# Patient Record
Sex: FEMALE | Race: WHITE
[De-identification: names, ages, dates, MRNs, and addresses within clinical notes are randomized per-mention and may not be internally consistent; named-entity substitution may affect disease eponyms.]

---

## 2017-03-13 PROBLEM — Z00.00 ENCOUNTER FOR PREVENTIVE HEALTH EXAMINATION: Status: ACTIVE | Noted: 2017-03-13

## 2017-04-17 ENCOUNTER — APPOINTMENT (OUTPATIENT)
Dept: UROGYNECOLOGY | Facility: CLINIC | Age: 57
End: 2017-04-17

## 2017-04-17 VITALS
SYSTOLIC BLOOD PRESSURE: 125 MMHG | DIASTOLIC BLOOD PRESSURE: 81 MMHG | HEIGHT: 64 IN | BODY MASS INDEX: 30.22 KG/M2 | WEIGHT: 177 LBS

## 2017-04-17 DIAGNOSIS — N39.41 URGE INCONTINENCE: ICD-10-CM

## 2017-04-17 LAB
BILIRUB UR QL STRIP: NEGATIVE
CLARITY UR: CLEAR
COLLECTION METHOD: NORMAL
GLUCOSE UR-MCNC: NEGATIVE
HCG UR QL: 1 EU/DL
HGB UR QL STRIP.AUTO: NORMAL
KETONES UR-MCNC: NEGATIVE
LEUKOCYTE ESTERASE UR QL STRIP: NEGATIVE
NITRITE UR QL STRIP: NEGATIVE
PH UR STRIP: 5.5
PROT UR STRIP-MCNC: NEGATIVE
SP GR UR STRIP: 1.02

## 2017-04-18 ENCOUNTER — RESULT REVIEW (OUTPATIENT)
Age: 57
End: 2017-04-18

## 2017-04-18 LAB
APPEARANCE: CLEAR
BACTERIA: NEGATIVE
BILIRUBIN URINE: NEGATIVE
BLOOD URINE: NEGATIVE
COLOR: YELLOW
CORE LAB FLUID CYTOLOGY: NORMAL
GLUCOSE QUALITATIVE U: NORMAL MG/DL
HYALINE CASTS: 0 /LPF
KETONES URINE: NEGATIVE
LEUKOCYTE ESTERASE URINE: NEGATIVE
MICROSCOPIC-UA: NORMAL
NITRITE URINE: NEGATIVE
PH URINE: 5.5
PROTEIN URINE: NEGATIVE MG/DL
RED BLOOD CELLS URINE: 3 /HPF
SPECIFIC GRAVITY URINE: 1.01
SQUAMOUS EPITHELIAL CELLS: 0 /HPF
UROBILINOGEN URINE: NORMAL MG/DL
WHITE BLOOD CELLS URINE: 0 /HPF

## 2017-04-19 ENCOUNTER — RESULT REVIEW (OUTPATIENT)
Age: 57
End: 2017-04-19

## 2017-04-19 LAB — BACTERIA UR CULT: NORMAL

## 2017-04-20 ENCOUNTER — APPOINTMENT (OUTPATIENT)
Dept: UROGYNECOLOGY | Facility: CLINIC | Age: 57
End: 2017-04-20

## 2017-04-20 VITALS
WEIGHT: 177 LBS | SYSTOLIC BLOOD PRESSURE: 123 MMHG | HEIGHT: 64 IN | BODY MASS INDEX: 30.22 KG/M2 | DIASTOLIC BLOOD PRESSURE: 77 MMHG

## 2017-06-13 ENCOUNTER — APPOINTMENT (OUTPATIENT)
Dept: UROGYNECOLOGY | Facility: CLINIC | Age: 57
End: 2017-06-13

## 2019-10-17 ENCOUNTER — APPOINTMENT (OUTPATIENT)
Dept: UROGYNECOLOGY | Facility: CLINIC | Age: 59
End: 2019-10-17
Payer: COMMERCIAL

## 2019-10-17 VITALS — DIASTOLIC BLOOD PRESSURE: 78 MMHG | SYSTOLIC BLOOD PRESSURE: 140 MMHG

## 2019-10-17 DIAGNOSIS — E03.9 HYPOTHYROIDISM, UNSPECIFIED: ICD-10-CM

## 2019-10-17 LAB
BILIRUB UR QL STRIP: NEGATIVE
CLARITY UR: CLEAR
COLLECTION METHOD: NORMAL
GLUCOSE UR-MCNC: NEGATIVE
HCG UR QL: 0.2 EU/DL
HGB UR QL STRIP.AUTO: NORMAL
KETONES UR-MCNC: NEGATIVE
LEUKOCYTE ESTERASE UR QL STRIP: NORMAL
NITRITE UR QL STRIP: POSITIVE
PH UR STRIP: 6
PROT UR STRIP-MCNC: NEGATIVE
SP GR UR STRIP: 1.01

## 2019-10-17 PROCEDURE — 99214 OFFICE O/P EST MOD 30 MIN: CPT | Mod: 25

## 2019-10-17 PROCEDURE — 81003 URINALYSIS AUTO W/O SCOPE: CPT | Mod: QW

## 2019-10-17 PROCEDURE — 51701 INSERT BLADDER CATHETER: CPT

## 2019-10-17 RX ORDER — LEVOTHYROXINE SODIUM 75 UG/1
75 TABLET ORAL
Refills: 0 | Status: DISCONTINUED | COMMUNITY
End: 2019-10-17

## 2019-10-17 NOTE — PHYSICAL EXAM
[No Acute Distress] : in no acute distress [Well Nourished] : ~L well nourished [Well developed] : well developed [No Edema] : ~T edema was not present [Oriented x3] : oriented to person, place, and time [Normal Gait] : gait was normal [Warm and Dry] : was warm and dry to touch [Normal Appearance] : general appearance was normal [2] : 2 [Aa ____] : Aa [unfilled] [Ba ____] : Ba [unfilled] [C ____] : C [unfilled] [GH ____] : GH [unfilled] [PB ____] : PB [unfilled] [TVL ____] : TVL  [unfilled] [Bp ____] : Bp [unfilled] [D ____] : D [unfilled] [Ap ____] : Ap [unfilled] [Normal] : no abnormalities [Post Void Residual ____ml] : post void residual via catheterization was [unfilled] ml [Tenderness] : ~T no ~M abdominal tenderness observed [Cough] : no cough [Hernia] : no hernia observed [Distended] : not distended

## 2019-10-17 NOTE — DISCUSSION/SUMMARY
[FreeTextEntry1] : Isela wants definitive surgery. She has been managing her prolapse with a pessary for 15 years and she is ready to have surgery. I recommended a pelvic ultrasound. Urodynamics was 2 years old so I recommend we do that again as her urinary symptoms have changed. I decided to treat her urine dip abnormality today. She recently reported a kidney infection but was asymptomatic until she ended up in the hospital. She's also has some bowel control issues recently. I gave her Macrobid twice a day for 7 days. I did send her urine to the lab. We talked about the options for surgically correcting her prolapse. We reviewed robotic surgery with or without mesh augmentation. We talked about what would be involved with the surgery the hospital stay and the convalescence. I was able to answer all of her questions.

## 2019-10-17 NOTE — HISTORY OF PRESENT ILLNESS
[FreeTextEntry1] : Has been using pessary for the last 15 years or so. She has had enough. She has been managing it herself. Takes it out daily to clean. Got kidney infection a few months back and is thinking the pessary could have something to do with it. Has some urgency and frequency. Not wearing pads. Chronic constipation. Pessary right at introitus by the end of the day. She has not had any surgery. Up to date on pap smear. No recent pelvic U/S.

## 2019-10-18 ENCOUNTER — RESULT REVIEW (OUTPATIENT)
Age: 59
End: 2019-10-18

## 2019-10-21 ENCOUNTER — APPOINTMENT (OUTPATIENT)
Dept: UROGYNECOLOGY | Facility: CLINIC | Age: 59
End: 2019-10-21

## 2019-10-21 ENCOUNTER — RESULT REVIEW (OUTPATIENT)
Age: 59
End: 2019-10-21

## 2019-10-23 ENCOUNTER — RESULT REVIEW (OUTPATIENT)
Age: 59
End: 2019-10-23

## 2019-10-24 ENCOUNTER — APPOINTMENT (OUTPATIENT)
Dept: UROGYNECOLOGY | Facility: CLINIC | Age: 59
End: 2019-10-24

## 2019-11-12 ENCOUNTER — OUTPATIENT (OUTPATIENT)
Dept: OUTPATIENT SERVICES | Facility: HOSPITAL | Age: 59
LOS: 1 days | End: 2019-11-12
Payer: COMMERCIAL

## 2019-11-12 ENCOUNTER — APPOINTMENT (OUTPATIENT)
Dept: ULTRASOUND IMAGING | Facility: CLINIC | Age: 59
End: 2019-11-12
Payer: COMMERCIAL

## 2019-11-12 DIAGNOSIS — N81.4 UTEROVAGINAL PROLAPSE, UNSPECIFIED: ICD-10-CM

## 2019-11-12 PROCEDURE — 76856 US EXAM PELVIC COMPLETE: CPT | Mod: 26,59

## 2019-11-12 PROCEDURE — 76830 TRANSVAGINAL US NON-OB: CPT | Mod: 26

## 2019-11-12 PROCEDURE — 76856 US EXAM PELVIC COMPLETE: CPT

## 2019-11-12 PROCEDURE — 76830 TRANSVAGINAL US NON-OB: CPT

## 2019-11-19 ENCOUNTER — APPOINTMENT (OUTPATIENT)
Dept: INTERNAL MEDICINE | Facility: CLINIC | Age: 59
End: 2019-11-19

## 2019-12-05 ENCOUNTER — APPOINTMENT (OUTPATIENT)
Dept: UROGYNECOLOGY | Facility: CLINIC | Age: 59
End: 2019-12-05
Payer: COMMERCIAL

## 2019-12-05 DIAGNOSIS — N32.81 OVERACTIVE BLADDER: ICD-10-CM

## 2019-12-05 DIAGNOSIS — R35.0 FREQUENCY OF MICTURITION: ICD-10-CM

## 2019-12-05 PROCEDURE — 51784 ANAL/URINARY MUSCLE STUDY: CPT

## 2019-12-05 PROCEDURE — 51797 INTRAABDOMINAL PRESSURE TEST: CPT

## 2019-12-05 PROCEDURE — 51741 ELECTRO-UROFLOWMETRY FIRST: CPT

## 2019-12-05 PROCEDURE — 51729 CYSTOMETROGRAM W/VP&UP: CPT

## 2020-01-02 ENCOUNTER — APPOINTMENT (OUTPATIENT)
Dept: UROGYNECOLOGY | Facility: CLINIC | Age: 60
End: 2020-01-02
Payer: COMMERCIAL

## 2020-01-02 DIAGNOSIS — N39.41 URGE INCONTINENCE: ICD-10-CM

## 2020-01-02 PROCEDURE — 99214 OFFICE O/P EST MOD 30 MIN: CPT

## 2020-01-02 NOTE — DISCUSSION/SUMMARY
[FreeTextEntry1] : Ms. Couch presented to the office today for counseling regarding her decision for possible pelvic reconstructive surgery. All pertinent prior studies including urodynamic were reviewed. 						\par She was counseled regarding alternative non-surgical therapies as well as the prognosis with no intervention.\par \par The patient was advised regarding various surgical options including abdominal, robotic/laparascopic and vaginal approaches. The risks and benefits of surgery using endogenous tissue only versus the use of graft insertion (mesh) were fully reviewed.  She was informed of the potential for improved durability and the inherent risk of graft use including but not limited to infection, erosion, pain, pain with intercourse, cervical elongation, disturbance in bowel or bladder function, any of which may require additional surgery for mesh revision.  She is aware that the mesh used in her surgery is a permanent mesh.  \par \par The general risks of the surgery were reviewed including, but not limited to infection, bleeding, surrounding organ or tissue injury, failure meaning recurrent prolapse, leaking, voiding dysfunction, needing to go home with a catheter and the risks of anesthesia.\par \par The approximate length of the surgery, hospital stay and postoperative recovery period were reviewed, including a general overview of convalescence and postoperative followup.\par \par The patient verbalized a desire to proceed with the surgery.  Appropriate informed consent was obtained robotic NORMA possible BSO SCP sling and cysto.  All questions were answered to the patient's satisfaction and we are looking to schedule her.\par \par

## 2020-01-02 NOTE — HISTORY OF PRESENT ILLNESS
[FreeTextEntry1] : Here to review options and testing. U/S showed 7mm lining and biopsy showed fragments of polyp. UDTs showed stress leaks and DO. We discussed risk of post op leaking and she wants a sling. We also discussed R/B/A BSO and she wants more time to think about it.

## 2020-01-28 ENCOUNTER — APPOINTMENT (OUTPATIENT)
Dept: UROGYNECOLOGY | Facility: HOSPITAL | Age: 60
End: 2020-01-28

## 2020-02-06 ENCOUNTER — APPOINTMENT (OUTPATIENT)
Dept: UROGYNECOLOGY | Facility: CLINIC | Age: 60
End: 2020-02-06

## 2020-03-05 ENCOUNTER — APPOINTMENT (OUTPATIENT)
Dept: UROGYNECOLOGY | Facility: CLINIC | Age: 60
End: 2020-03-05

## 2020-08-10 ENCOUNTER — INPATIENT (INPATIENT)
Facility: HOSPITAL | Age: 60
LOS: 1 days | Discharge: HOME | DRG: 854 | End: 2020-08-12
Attending: STUDENT IN AN ORGANIZED HEALTH CARE EDUCATION/TRAINING PROGRAM | Admitting: STUDENT IN AN ORGANIZED HEALTH CARE EDUCATION/TRAINING PROGRAM
Payer: COMMERCIAL

## 2020-08-10 VITALS — HEIGHT: 64 IN | WEIGHT: 175.05 LBS

## 2020-08-10 DIAGNOSIS — N20.1 CALCULUS OF URETER: ICD-10-CM

## 2020-08-10 DIAGNOSIS — A41.9 SEPSIS, UNSPECIFIED ORGANISM: ICD-10-CM

## 2020-08-10 LAB
ALBUMIN SERPL ELPH-MCNC: 3.3 G/DL — SIGNIFICANT CHANGE UP (ref 3.3–5)
ALP SERPL-CCNC: 102 U/L — SIGNIFICANT CHANGE UP (ref 40–120)
ALT FLD-CCNC: 20 U/L — SIGNIFICANT CHANGE UP (ref 12–78)
ANION GAP SERPL CALC-SCNC: 8 MMOL/L — SIGNIFICANT CHANGE UP (ref 5–17)
APPEARANCE UR: ABNORMAL
APTT BLD: 25.7 SEC — LOW (ref 27.5–35.5)
AST SERPL-CCNC: 18 U/L — SIGNIFICANT CHANGE UP (ref 15–37)
BASOPHILS # BLD AUTO: 0.04 K/UL — SIGNIFICANT CHANGE UP (ref 0–0.2)
BASOPHILS NFR BLD AUTO: 0.4 % — SIGNIFICANT CHANGE UP (ref 0–2)
BILIRUB SERPL-MCNC: 0.6 MG/DL — SIGNIFICANT CHANGE UP (ref 0.2–1.2)
BILIRUB UR-MCNC: NEGATIVE — SIGNIFICANT CHANGE UP
BUN SERPL-MCNC: 13 MG/DL — SIGNIFICANT CHANGE UP (ref 7–23)
CALCIUM SERPL-MCNC: 9.1 MG/DL — SIGNIFICANT CHANGE UP (ref 8.5–10.1)
CHLORIDE SERPL-SCNC: 101 MMOL/L — SIGNIFICANT CHANGE UP (ref 96–108)
CO2 SERPL-SCNC: 25 MMOL/L — SIGNIFICANT CHANGE UP (ref 22–31)
COLOR SPEC: YELLOW — SIGNIFICANT CHANGE UP
CREAT SERPL-MCNC: 0.92 MG/DL — SIGNIFICANT CHANGE UP (ref 0.5–1.3)
DIFF PNL FLD: ABNORMAL
EOSINOPHIL # BLD AUTO: 0.03 K/UL — SIGNIFICANT CHANGE UP (ref 0–0.5)
EOSINOPHIL NFR BLD AUTO: 0.3 % — SIGNIFICANT CHANGE UP (ref 0–6)
GLUCOSE SERPL-MCNC: 94 MG/DL — SIGNIFICANT CHANGE UP (ref 70–99)
GLUCOSE UR QL: NEGATIVE MG/DL — SIGNIFICANT CHANGE UP
HCT VFR BLD CALC: 37.7 % — SIGNIFICANT CHANGE UP (ref 34.5–45)
HGB BLD-MCNC: 12.5 G/DL — SIGNIFICANT CHANGE UP (ref 11.5–15.5)
IMM GRANULOCYTES NFR BLD AUTO: 0.2 % — SIGNIFICANT CHANGE UP (ref 0–1.5)
INR BLD: 1.18 RATIO — HIGH (ref 0.88–1.16)
KETONES UR-MCNC: ABNORMAL
LACTATE SERPL-SCNC: 1 MMOL/L — SIGNIFICANT CHANGE UP (ref 0.7–2)
LEUKOCYTE ESTERASE UR-ACNC: ABNORMAL
LYMPHOCYTES # BLD AUTO: 0.96 K/UL — LOW (ref 1–3.3)
LYMPHOCYTES # BLD AUTO: 10.2 % — LOW (ref 13–44)
MCHC RBC-ENTMCNC: 29.3 PG — SIGNIFICANT CHANGE UP (ref 27–34)
MCHC RBC-ENTMCNC: 33.2 GM/DL — SIGNIFICANT CHANGE UP (ref 32–36)
MCV RBC AUTO: 88.3 FL — SIGNIFICANT CHANGE UP (ref 80–100)
MONOCYTES # BLD AUTO: 0.75 K/UL — SIGNIFICANT CHANGE UP (ref 0–0.9)
MONOCYTES NFR BLD AUTO: 8 % — SIGNIFICANT CHANGE UP (ref 2–14)
NEUTROPHILS # BLD AUTO: 7.57 K/UL — HIGH (ref 1.8–7.4)
NEUTROPHILS NFR BLD AUTO: 80.9 % — HIGH (ref 43–77)
NITRITE UR-MCNC: POSITIVE
PH UR: 5 — SIGNIFICANT CHANGE UP (ref 5–8)
PLATELET # BLD AUTO: 256 K/UL — SIGNIFICANT CHANGE UP (ref 150–400)
POTASSIUM SERPL-MCNC: 4.1 MMOL/L — SIGNIFICANT CHANGE UP (ref 3.5–5.3)
POTASSIUM SERPL-SCNC: 4.1 MMOL/L — SIGNIFICANT CHANGE UP (ref 3.5–5.3)
PROT SERPL-MCNC: 8.1 GM/DL — SIGNIFICANT CHANGE UP (ref 6–8.3)
PROT UR-MCNC: 100 MG/DL
PROTHROM AB SERPL-ACNC: 13.6 SEC — SIGNIFICANT CHANGE UP (ref 10.6–13.6)
RBC # BLD: 4.27 M/UL — SIGNIFICANT CHANGE UP (ref 3.8–5.2)
RBC # FLD: 13.7 % — SIGNIFICANT CHANGE UP (ref 10.3–14.5)
SARS-COV-2 RNA SPEC QL NAA+PROBE: SIGNIFICANT CHANGE UP
SODIUM SERPL-SCNC: 134 MMOL/L — LOW (ref 135–145)
SP GR SPEC: 1.01 — SIGNIFICANT CHANGE UP (ref 1.01–1.02)
UROBILINOGEN FLD QL: NEGATIVE MG/DL — SIGNIFICANT CHANGE UP
WBC # BLD: 9.37 K/UL — SIGNIFICANT CHANGE UP (ref 3.8–10.5)
WBC # FLD AUTO: 9.37 K/UL — SIGNIFICANT CHANGE UP (ref 3.8–10.5)

## 2020-08-10 PROCEDURE — 86769 SARS-COV-2 COVID-19 ANTIBODY: CPT

## 2020-08-10 PROCEDURE — C1769: CPT

## 2020-08-10 PROCEDURE — 76000 FLUOROSCOPY <1 HR PHYS/QHP: CPT

## 2020-08-10 PROCEDURE — 86900 BLOOD TYPING SEROLOGIC ABO: CPT

## 2020-08-10 PROCEDURE — C2617: CPT

## 2020-08-10 PROCEDURE — 52332 CYSTOSCOPY AND TREATMENT: CPT | Mod: RT

## 2020-08-10 PROCEDURE — 74177 CT ABD & PELVIS W/CONTRAST: CPT | Mod: 26

## 2020-08-10 PROCEDURE — 85027 COMPLETE CBC AUTOMATED: CPT

## 2020-08-10 PROCEDURE — 80048 BASIC METABOLIC PNL TOTAL CA: CPT

## 2020-08-10 PROCEDURE — 93010 ELECTROCARDIOGRAM REPORT: CPT

## 2020-08-10 PROCEDURE — 36415 COLL VENOUS BLD VENIPUNCTURE: CPT

## 2020-08-10 PROCEDURE — 86850 RBC ANTIBODY SCREEN: CPT

## 2020-08-10 PROCEDURE — 71045 X-RAY EXAM CHEST 1 VIEW: CPT | Mod: 26

## 2020-08-10 PROCEDURE — 86803 HEPATITIS C AB TEST: CPT

## 2020-08-10 PROCEDURE — 80053 COMPREHEN METABOLIC PANEL: CPT

## 2020-08-10 PROCEDURE — 86901 BLOOD TYPING SEROLOGIC RH(D): CPT

## 2020-08-10 RX ORDER — CEFTRIAXONE 500 MG/1
2000 INJECTION, POWDER, FOR SOLUTION INTRAMUSCULAR; INTRAVENOUS EVERY 24 HOURS
Refills: 0 | Status: DISCONTINUED | OUTPATIENT
Start: 2020-08-11 | End: 2020-08-10

## 2020-08-10 RX ORDER — OXYCODONE AND ACETAMINOPHEN 5; 325 MG/1; MG/1
2 TABLET ORAL EVERY 6 HOURS
Refills: 0 | Status: DISCONTINUED | OUTPATIENT
Start: 2020-08-10 | End: 2020-08-10

## 2020-08-10 RX ORDER — OXYBUTYNIN CHLORIDE 5 MG
5 TABLET ORAL EVERY 6 HOURS
Refills: 0 | Status: DISCONTINUED | OUTPATIENT
Start: 2020-08-10 | End: 2020-08-12

## 2020-08-10 RX ORDER — ACETAMINOPHEN 500 MG
650 TABLET ORAL EVERY 6 HOURS
Refills: 0 | Status: DISCONTINUED | OUTPATIENT
Start: 2020-08-10 | End: 2020-08-10

## 2020-08-10 RX ORDER — OXYBUTYNIN CHLORIDE 5 MG
5 TABLET ORAL EVERY 6 HOURS
Refills: 0 | Status: DISCONTINUED | OUTPATIENT
Start: 2020-08-10 | End: 2020-08-10

## 2020-08-10 RX ORDER — HYDROMORPHONE HYDROCHLORIDE 2 MG/ML
1 INJECTION INTRAMUSCULAR; INTRAVENOUS; SUBCUTANEOUS EVERY 4 HOURS
Refills: 0 | Status: DISCONTINUED | OUTPATIENT
Start: 2020-08-10 | End: 2020-08-10

## 2020-08-10 RX ORDER — HYDROMORPHONE HYDROCHLORIDE 2 MG/ML
1 INJECTION INTRAMUSCULAR; INTRAVENOUS; SUBCUTANEOUS EVERY 4 HOURS
Refills: 0 | Status: DISCONTINUED | OUTPATIENT
Start: 2020-08-10 | End: 2020-08-12

## 2020-08-10 RX ORDER — HYDROMORPHONE HYDROCHLORIDE 2 MG/ML
0.5 INJECTION INTRAMUSCULAR; INTRAVENOUS; SUBCUTANEOUS
Refills: 0 | Status: DISCONTINUED | OUTPATIENT
Start: 2020-08-10 | End: 2020-08-11

## 2020-08-10 RX ORDER — SODIUM CHLORIDE 9 MG/ML
1000 INJECTION INTRAMUSCULAR; INTRAVENOUS; SUBCUTANEOUS
Refills: 0 | Status: DISCONTINUED | OUTPATIENT
Start: 2020-08-10 | End: 2020-08-12

## 2020-08-10 RX ORDER — SODIUM CHLORIDE 9 MG/ML
1000 INJECTION INTRAMUSCULAR; INTRAVENOUS; SUBCUTANEOUS
Refills: 0 | Status: DISCONTINUED | OUTPATIENT
Start: 2020-08-10 | End: 2020-08-10

## 2020-08-10 RX ORDER — LEVOTHYROXINE SODIUM 125 MCG
100 TABLET ORAL DAILY
Refills: 0 | Status: DISCONTINUED | OUTPATIENT
Start: 2020-08-10 | End: 2020-08-10

## 2020-08-10 RX ORDER — METRONIDAZOLE 500 MG
500 TABLET ORAL ONCE
Refills: 0 | Status: COMPLETED | OUTPATIENT
Start: 2020-08-10 | End: 2020-08-10

## 2020-08-10 RX ORDER — PHENAZOPYRIDINE HCL 100 MG
200 TABLET ORAL ONCE
Refills: 0 | Status: COMPLETED | OUTPATIENT
Start: 2020-08-10 | End: 2020-08-10

## 2020-08-10 RX ORDER — ACETAMINOPHEN 500 MG
650 TABLET ORAL EVERY 6 HOURS
Refills: 0 | Status: DISCONTINUED | OUTPATIENT
Start: 2020-08-10 | End: 2020-08-12

## 2020-08-10 RX ORDER — PHENAZOPYRIDINE HCL 100 MG
200 TABLET ORAL THREE TIMES A DAY
Refills: 0 | Status: DISCONTINUED | OUTPATIENT
Start: 2020-08-10 | End: 2020-08-12

## 2020-08-10 RX ORDER — LEVOTHYROXINE SODIUM 125 MCG
100 TABLET ORAL DAILY
Refills: 0 | Status: DISCONTINUED | OUTPATIENT
Start: 2020-08-10 | End: 2020-08-12

## 2020-08-10 RX ORDER — ACETAMINOPHEN 500 MG
650 TABLET ORAL ONCE
Refills: 0 | Status: COMPLETED | OUTPATIENT
Start: 2020-08-10 | End: 2020-08-10

## 2020-08-10 RX ORDER — FENTANYL CITRATE 50 UG/ML
25 INJECTION INTRAVENOUS
Refills: 0 | Status: DISCONTINUED | OUTPATIENT
Start: 2020-08-10 | End: 2020-08-11

## 2020-08-10 RX ORDER — CEFTRIAXONE 500 MG/1
1000 INJECTION, POWDER, FOR SOLUTION INTRAMUSCULAR; INTRAVENOUS ONCE
Refills: 0 | Status: DISCONTINUED | OUTPATIENT
Start: 2020-08-10 | End: 2020-08-10

## 2020-08-10 RX ORDER — OXYBUTYNIN CHLORIDE 5 MG
5 TABLET ORAL ONCE
Refills: 0 | Status: COMPLETED | OUTPATIENT
Start: 2020-08-10 | End: 2020-08-10

## 2020-08-10 RX ORDER — OXYCODONE AND ACETAMINOPHEN 5; 325 MG/1; MG/1
2 TABLET ORAL EVERY 6 HOURS
Refills: 0 | Status: DISCONTINUED | OUTPATIENT
Start: 2020-08-10 | End: 2020-08-12

## 2020-08-10 RX ORDER — ONDANSETRON 8 MG/1
4 TABLET, FILM COATED ORAL EVERY 6 HOURS
Refills: 0 | Status: DISCONTINUED | OUTPATIENT
Start: 2020-08-10 | End: 2020-08-10

## 2020-08-10 RX ORDER — SODIUM CHLORIDE 9 MG/ML
1700 INJECTION INTRAMUSCULAR; INTRAVENOUS; SUBCUTANEOUS ONCE
Refills: 0 | Status: COMPLETED | OUTPATIENT
Start: 2020-08-10 | End: 2020-08-10

## 2020-08-10 RX ORDER — CEFTRIAXONE 500 MG/1
2000 INJECTION, POWDER, FOR SOLUTION INTRAMUSCULAR; INTRAVENOUS EVERY 24 HOURS
Refills: 0 | Status: DISCONTINUED | OUTPATIENT
Start: 2020-08-10 | End: 2020-08-11

## 2020-08-10 RX ORDER — SODIUM CHLORIDE 9 MG/ML
1000 INJECTION, SOLUTION INTRAVENOUS
Refills: 0 | Status: DISCONTINUED | OUTPATIENT
Start: 2020-08-10 | End: 2020-08-11

## 2020-08-10 RX ORDER — CEFTRIAXONE 500 MG/1
1000 INJECTION, POWDER, FOR SOLUTION INTRAMUSCULAR; INTRAVENOUS ONCE
Refills: 0 | Status: COMPLETED | OUTPATIENT
Start: 2020-08-10 | End: 2020-08-10

## 2020-08-10 RX ORDER — ONDANSETRON 8 MG/1
4 TABLET, FILM COATED ORAL EVERY 6 HOURS
Refills: 0 | Status: DISCONTINUED | OUTPATIENT
Start: 2020-08-10 | End: 2020-08-12

## 2020-08-10 RX ADMIN — Medication 650 MILLIGRAM(S): at 18:35

## 2020-08-10 RX ADMIN — CEFTRIAXONE 1000 MILLIGRAM(S): 500 INJECTION, POWDER, FOR SOLUTION INTRAMUSCULAR; INTRAVENOUS at 17:37

## 2020-08-10 RX ADMIN — Medication 200 MILLIGRAM(S): at 23:49

## 2020-08-10 RX ADMIN — Medication 5 MILLIGRAM(S): at 23:49

## 2020-08-10 RX ADMIN — Medication 100 MILLIGRAM(S): at 18:30

## 2020-08-10 RX ADMIN — SODIUM CHLORIDE 1700 MILLILITER(S): 9 INJECTION INTRAMUSCULAR; INTRAVENOUS; SUBCUTANEOUS at 17:21

## 2020-08-10 RX ADMIN — Medication 650 MILLIGRAM(S): at 17:38

## 2020-08-10 NOTE — H&P ADULT - NSHPPHYSICALEXAM_GEN_ALL_CORE
NAD, A+Ox3  NCAT  MMM  EOMI  RRR  no increased wob  abd s nt nd, obese  bladder nonpalpable  RIGHT CVA tenderness  no LE edema  moving all extremities  no focal neurological deficits

## 2020-08-10 NOTE — ED STATDOCS - NS_ATTENDINGSCRIBE_ED_ALL_ED
Patient's urine drug screen is negative for all prescribed medications including Vyvanse tramadol soma and Lyrica.  Patient reports usage of Vyvanse 2 days prior to evaluation and tramadol one week prior inconsistent with dosing and prescription refills.    No further refills from medications.  Patient also has been discharged from care due to too many missed appointments and can inconsistency in therapies.   I personally performed the service described in the documentation recorded by the scribe in my presence, and it accurately and completely records my words and actions.

## 2020-08-10 NOTE — ED STATDOCS - OBJECTIVE STATEMENT
60 y/o female with no significant PMHx presents to the ED sent by PCP c/o intermittent right lower back pain x2 weeks. Denies dysuria. Notes +intermittent headache +increased thirst and urination frequency. Pt notes collapsing in her shower yesterday secondary to weakness, denies syncope. Pt also notes multiple episodes of emesis and diarrhea recently. +nausea +fever. Notes kidney infection last year. Nonsmoker. No other complaints at this time. NKDA. PCP: Margarita Sanchez.

## 2020-08-10 NOTE — H&P ADULT - ASSESSMENT
60 yo F with urosepsis (UA c/w infection, tachycardia, fever) from obstructing RIGHT ureteral stone. Plan for emergent RIGHT ureteral stent placement  - NPO, IVF  - abx  - pain control  - home meds  - SCDs, incentive spirometer  - to OR for cystoscopic RIGHT ureteral stent placement

## 2020-08-10 NOTE — H&P ADULT - NSHPLABSRESULTS_GEN_ALL_CORE
12.5   9.37  )-----------( 256      ( 10 Aug 2020 16:59 )             37.7     08-10    134<L>  |  101  |  13  ----------------------------<  94  4.1   |  25  |  0.92    Ca    9.1      10 Aug 2020 16:59    TPro  8.1  /  Alb  3.3  /  TBili  0.6  /  DBili  x   /  AST  18  /  ALT  20  /  AlkPhos  102  08-10    Urinalysis (08.10.20 @ 16:59)    Glucose Qualitative, Urine: Negative mg/dL    Blood, Urine: Large    pH Urine: 5.0    Color: Yellow    Urine Appearance: very cloudy    Bilirubin: Negative    Ketone - Urine: Small    Specific Gravity: 1.015    Protein, Urine: 100 mg/dL    Urobilinogen: Negative mg/dL    Nitrite: Positive    Leukocyte Esterase Concentration: ModerateUrine Microscopic-Add On (NC) (08.10.20 @ 16:59)    Red Blood Cell - Urine: >50 /HPF    White Blood Cell - Urine: >50    Comment - Urine: Moderate WBC Clumps    Epithelial Cells: Few    Bacteria: TNTC    < from: CT Abdomen and Pelvis w/ IV Cont (08.10.20 @ 18:50) >    KIDNEYS/URETERS:  There is a delayed right-sided nephrogram with a moderate right-sided hydroureteronephrosis secondary to 6 mm obstructing calculus at the distal right ureter.  There is uroepithelial enhancement/distal ureteral thickening with periureteral stranding; findings which may reflect superimposed urinary tract infection.    There is a prominent left extrarenal pelvis.    < end of copied text >

## 2020-08-10 NOTE — ED STATDOCS - CARE PLAN
Principal Discharge DX:	Calculus of ureter  Secondary Diagnosis:	Sepsis, due to unspecified organism, unspecified whether acute organ dysfunction present

## 2020-08-10 NOTE — ED STATDOCS - PROGRESS NOTE DETAILS
58 y/o F with no PMH presents with right back pain x 2 weeks. States pain significantly worsened this weekend. +urinary frequency. Denies dysuria. +chills, fever, nausea/vomiting. Notes history of pyelonephritis. Denies history of kidney stones. PE: uncomfortable appearing. Cardiac: s1s2, RRR. Lungs: CTAB. Abdomen: NBS x4, RUQ and right CVAT. A/P: Concern for pyelonephritis, kidney stone. Plan for labs, IVF, antibiotics,, CTAP, reassess. - Shahbaz Stewart PA-C CT findings reviewed with patient. Will discuss with urology. - Shahbaz Stewart PA-C D/w Dr. De La Cruz, will come to ED to evaluate patient, likely to require ureter stent. - MANDIE PedersenC

## 2020-08-10 NOTE — H&P ADULT - HISTORY OF PRESENT ILLNESS
60 yo F with h/o hypothyroidism, presents to the  ED with RIGHT flank pain, sent by PCP c/o intermittent right lower back pain x2 weeks. Denies dysuria. Notes +intermittent headache +increased thirst and urination frequency. Pt notes collapsing in her shower yesterday secondary to weakness, denies syncope. Pt also notes multiple episodes of emesis and diarrhea recently. +nausea +fever of 102 in PCP office.

## 2020-08-11 LAB
ALBUMIN SERPL ELPH-MCNC: 3.1 G/DL — LOW (ref 3.3–5)
ALP SERPL-CCNC: 88 U/L — SIGNIFICANT CHANGE UP (ref 40–120)
ALT FLD-CCNC: 18 U/L — SIGNIFICANT CHANGE UP (ref 12–78)
ANION GAP SERPL CALC-SCNC: 9 MMOL/L — SIGNIFICANT CHANGE UP (ref 5–17)
AST SERPL-CCNC: 17 U/L — SIGNIFICANT CHANGE UP (ref 15–37)
BILIRUB SERPL-MCNC: 0.4 MG/DL — SIGNIFICANT CHANGE UP (ref 0.2–1.2)
BUN SERPL-MCNC: 13 MG/DL — SIGNIFICANT CHANGE UP (ref 7–23)
CALCIUM SERPL-MCNC: 8.9 MG/DL — SIGNIFICANT CHANGE UP (ref 8.5–10.1)
CHLORIDE SERPL-SCNC: 109 MMOL/L — HIGH (ref 96–108)
CO2 SERPL-SCNC: 22 MMOL/L — SIGNIFICANT CHANGE UP (ref 22–31)
CREAT SERPL-MCNC: 0.6 MG/DL — SIGNIFICANT CHANGE UP (ref 0.5–1.3)
GLUCOSE SERPL-MCNC: 116 MG/DL — HIGH (ref 70–99)
HCV AB S/CO SERPL IA: 0.07 S/CO — SIGNIFICANT CHANGE UP (ref 0–0.99)
HCV AB SERPL-IMP: SIGNIFICANT CHANGE UP
POTASSIUM SERPL-MCNC: 4.6 MMOL/L — SIGNIFICANT CHANGE UP (ref 3.5–5.3)
POTASSIUM SERPL-SCNC: 4.6 MMOL/L — SIGNIFICANT CHANGE UP (ref 3.5–5.3)
PROT SERPL-MCNC: 7.4 GM/DL — SIGNIFICANT CHANGE UP (ref 6–8.3)
SARS-COV-2 IGG SERPL QL IA: NEGATIVE — SIGNIFICANT CHANGE UP
SARS-COV-2 IGM SERPL IA-ACNC: <0.1 INDEX — SIGNIFICANT CHANGE UP
SARS-COV-2 RNA SPEC QL NAA+PROBE: SIGNIFICANT CHANGE UP
SODIUM SERPL-SCNC: 140 MMOL/L — SIGNIFICANT CHANGE UP (ref 135–145)

## 2020-08-11 RX ORDER — CEFTRIAXONE 500 MG/1
1000 INJECTION, POWDER, FOR SOLUTION INTRAMUSCULAR; INTRAVENOUS EVERY 24 HOURS
Refills: 0 | Status: DISCONTINUED | OUTPATIENT
Start: 2020-08-11 | End: 2020-08-12

## 2020-08-11 RX ADMIN — Medication 100 MICROGRAM(S): at 05:51

## 2020-08-11 RX ADMIN — CEFTRIAXONE 1000 MILLIGRAM(S): 500 INJECTION, POWDER, FOR SOLUTION INTRAMUSCULAR; INTRAVENOUS at 17:08

## 2020-08-11 NOTE — PROGRESS NOTE ADULT - ASSESSMENT
58 yo F with urosepsis (UA c/w infection, tachycardia, fever) from obstructing RIGHT ureteral stone. Pt now POD 1 s/p emergent RIGHT ureteral stent placement.   - AM labs  - IVF  - ABX  - pain control  - SCDs, incentive spirometer  - Will follow up urine c/s and adjust abx accordingly    Case discussed with Dr. De La Cruz

## 2020-08-11 NOTE — PROGRESS NOTE ADULT - SUBJECTIVE AND OBJECTIVE BOX
Patient seen and examined at bedside. Patient is POD 1 s/p right ureteral stent placement on 8/10/20. Patient reports she feels tired and her BP is still on the lower limits of normal. denies any fevers, chills, n/v, abd/flank pain or dysuria.     General: No distress, No anxiety  VITALS  T(C): 36.4 (08-11-20 @ 09:16), Max: 37.6 (08-10-20 @ 16:14)  HR: 76 (08-11-20 @ 09:16) (70 - 126)  BP: 94/55 (08-11-20 @ 09:16) (91/56 - 121/91)  RR: 16 (08-11-20 @ 09:16) (16 - 20)  SpO2: 97% (08-11-20 @ 09:16) (95% - 100%)            Skin     : No jaundice, No lesions, No swelling  HEENT: No icterus , EOM full , No epistaxis  Abdo:   : Soft, Non tender, No guarding, No distension   Back    : No CVAT b/l  Extremity: No calf tenderness, No edema  Genitalia Female: No Carmichael  Neuro   : A&Ox3    LABS:                        12.5   9.37  )-----------( 256      ( 10 Aug 2020 16:59 )             37.7     08-11    140  |  109<H>  |  13  ----------------------------<  116<H>  4.6   |  22  |  0.60    Ca    8.9      11 Aug 2020 07:24    TPro  7.4  /  Alb  3.1<L>  /  TBili  0.4  /  DBili  x   /  AST  17  /  ALT  18  /  AlkPhos  88  08-11

## 2020-08-11 NOTE — PATIENT PROFILE ADULT - VISION (WITH CORRECTIVE LENSES IF THE PATIENT USUALLY WEARS THEM):
Partially impaired: cannot see medication labels or newsprint, but can see obstacles in path, and the surrounding layout; can count fingers at arm's length
Statement Selected

## 2020-08-12 ENCOUNTER — TRANSCRIPTION ENCOUNTER (OUTPATIENT)
Age: 60
End: 2020-08-12

## 2020-08-12 VITALS
OXYGEN SATURATION: 99 % | TEMPERATURE: 100 F | SYSTOLIC BLOOD PRESSURE: 109 MMHG | HEART RATE: 103 BPM | RESPIRATION RATE: 16 BRPM | DIASTOLIC BLOOD PRESSURE: 49 MMHG

## 2020-08-12 LAB
-  AMIKACIN: SIGNIFICANT CHANGE UP
-  AMOXICILLIN/CLAVULANIC ACID: SIGNIFICANT CHANGE UP
-  AMPICILLIN/SULBACTAM: SIGNIFICANT CHANGE UP
-  AMPICILLIN: SIGNIFICANT CHANGE UP
-  AZTREONAM: SIGNIFICANT CHANGE UP
-  CEFAZOLIN: SIGNIFICANT CHANGE UP
-  CEFEPIME: SIGNIFICANT CHANGE UP
-  CEFOXITIN: SIGNIFICANT CHANGE UP
-  CEFTRIAXONE: SIGNIFICANT CHANGE UP
-  CIPROFLOXACIN: SIGNIFICANT CHANGE UP
-  ERTAPENEM: SIGNIFICANT CHANGE UP
-  GENTAMICIN: SIGNIFICANT CHANGE UP
-  IMIPENEM: SIGNIFICANT CHANGE UP
-  LEVOFLOXACIN: SIGNIFICANT CHANGE UP
-  MEROPENEM: SIGNIFICANT CHANGE UP
-  NITROFURANTOIN: SIGNIFICANT CHANGE UP
-  PIPERACILLIN/TAZOBACTAM: SIGNIFICANT CHANGE UP
-  TIGECYCLINE: SIGNIFICANT CHANGE UP
-  TOBRAMYCIN: SIGNIFICANT CHANGE UP
-  TRIMETHOPRIM/SULFAMETHOXAZOLE: SIGNIFICANT CHANGE UP
ANION GAP SERPL CALC-SCNC: 5 MMOL/L — SIGNIFICANT CHANGE UP (ref 5–17)
BUN SERPL-MCNC: 15 MG/DL — SIGNIFICANT CHANGE UP (ref 7–23)
CALCIUM SERPL-MCNC: 8 MG/DL — LOW (ref 8.5–10.1)
CHLORIDE SERPL-SCNC: 114 MMOL/L — HIGH (ref 96–108)
CO2 SERPL-SCNC: 26 MMOL/L — SIGNIFICANT CHANGE UP (ref 22–31)
CREAT SERPL-MCNC: 0.69 MG/DL — SIGNIFICANT CHANGE UP (ref 0.5–1.3)
CULTURE RESULTS: SIGNIFICANT CHANGE UP
GLUCOSE SERPL-MCNC: 83 MG/DL — SIGNIFICANT CHANGE UP (ref 70–99)
HCT VFR BLD CALC: 33.6 % — LOW (ref 34.5–45)
HGB BLD-MCNC: 11 G/DL — LOW (ref 11.5–15.5)
MCHC RBC-ENTMCNC: 29 PG — SIGNIFICANT CHANGE UP (ref 27–34)
MCHC RBC-ENTMCNC: 32.7 GM/DL — SIGNIFICANT CHANGE UP (ref 32–36)
MCV RBC AUTO: 88.7 FL — SIGNIFICANT CHANGE UP (ref 80–100)
METHOD TYPE: SIGNIFICANT CHANGE UP
ORGANISM # SPEC MICROSCOPIC CNT: SIGNIFICANT CHANGE UP
ORGANISM # SPEC MICROSCOPIC CNT: SIGNIFICANT CHANGE UP
PLATELET # BLD AUTO: 245 K/UL — SIGNIFICANT CHANGE UP (ref 150–400)
POTASSIUM SERPL-MCNC: 3.9 MMOL/L — SIGNIFICANT CHANGE UP (ref 3.5–5.3)
POTASSIUM SERPL-SCNC: 3.9 MMOL/L — SIGNIFICANT CHANGE UP (ref 3.5–5.3)
RBC # BLD: 3.79 M/UL — LOW (ref 3.8–5.2)
RBC # FLD: 13.6 % — SIGNIFICANT CHANGE UP (ref 10.3–14.5)
SODIUM SERPL-SCNC: 145 MMOL/L — SIGNIFICANT CHANGE UP (ref 135–145)
SPECIMEN SOURCE: SIGNIFICANT CHANGE UP
WBC # BLD: 7.28 K/UL — SIGNIFICANT CHANGE UP (ref 3.8–10.5)
WBC # FLD AUTO: 7.28 K/UL — SIGNIFICANT CHANGE UP (ref 3.8–10.5)

## 2020-08-12 RX ORDER — TAMSULOSIN HYDROCHLORIDE 0.4 MG/1
1 CAPSULE ORAL
Qty: 30 | Refills: 0
Start: 2020-08-12 | End: 2020-09-10

## 2020-08-12 RX ORDER — MOXIFLOXACIN HYDROCHLORIDE TABLETS, 400 MG 400 MG/1
1 TABLET, FILM COATED ORAL
Qty: 10 | Refills: 0
Start: 2020-08-12 | End: 2020-08-16

## 2020-08-12 RX ORDER — OXYBUTYNIN CHLORIDE 5 MG
1 TABLET ORAL
Qty: 16 | Refills: 0
Start: 2020-08-12 | End: 2020-08-15

## 2020-08-12 RX ORDER — PHENAZOPYRIDINE HCL 100 MG
1 TABLET ORAL
Qty: 9 | Refills: 0
Start: 2020-08-12 | End: 2020-08-14

## 2020-08-12 RX ADMIN — SODIUM CHLORIDE 125 MILLILITER(S): 9 INJECTION INTRAMUSCULAR; INTRAVENOUS; SUBCUTANEOUS at 02:33

## 2020-08-12 RX ADMIN — Medication 100 MICROGRAM(S): at 06:28

## 2020-08-12 NOTE — DISCHARGE NOTE PROVIDER - NSDCCPCAREPLAN_GEN_ALL_CORE_FT
PRINCIPAL DISCHARGE DIAGNOSIS  Diagnosis: Calculus of ureter  Assessment and Plan of Treatment:       SECONDARY DISCHARGE DIAGNOSES  Diagnosis: Sepsis, due to unspecified organism, unspecified whether acute organ dysfunction present  Assessment and Plan of Treatment:

## 2020-08-12 NOTE — DISCHARGE NOTE PROVIDER - REASON FOR ADMISSION
obstructing ureteral stone, sepsis Attending MD Celestina Morrow:   I personally have seen and examined this patient.  Physician assistant note reviewed and agree on plan of care and except where noted.  See HPI, PE, and MDM for details.

## 2020-08-12 NOTE — PROGRESS NOTE ADULT - ASSESSMENT
60 yo F with urosepsis (UA c/w infection, tachycardia, fever) from obstructing RIGHT ureteral stone. Pt now POD 2 s/p emergent RIGHT ureteral stent placement. Pt is doing well post op, tolerating stent. UCx >100,000 CFU/ml Klebsiella pneumoniae  Plan  - Discharge pt with Oxybutynin 5 mg Q8hrs PRN bladder spasms, Pyridium 200 mg Q8hrs PRN dysuria, Oxycodone PRN, Cipro 500 mg BID x 5 days and Flomax 0.4 mg QHS  - Follow up with Dr. De La Cruz in 1-2 weeks for stent and stone management    Case discussed with Dr. De La Cruz

## 2020-08-12 NOTE — DISCHARGE NOTE PROVIDER - CARE PROVIDER_API CALL
Flex De La Cruz  UROLOGY  [id number] [place] [address]  Phone: [phone]  Fax: [phone]  Follow Up Time: 1 week

## 2020-08-12 NOTE — DISCHARGE NOTE PROVIDER - HOSPITAL COURSE
Patient seen and examined at bedside. Patient is POD2 s/p right ureteral stent placement on 8/10/20. Patient reports she feels much better and denies any fevers, chills, n/v, abd/flank pain or dysuria. She is tolerating the stent well.        PE    General: No distress, No anxiety    Vital Signs Last 24 Hrs    T(C): 37.5 (12 Aug 2020 09:15), Max: 37.5 (12 Aug 2020 09:15)    T(F): 99.5 (12 Aug 2020 09:15), Max: 99.5 (12 Aug 2020 09:15)    HR: 103 (12 Aug 2020 09:15) (68 - 103)    BP: 109/49 (12 Aug 2020 09:15) (109/49 - 116/60)    BP(mean): 65 (12 Aug 2020 09:15) (65 - 65)    RR: 16 (12 Aug 2020 09:15) (16 - 16)    SpO2: 99% (12 Aug 2020 09:15) (95% - 99%)                Skin     : No jaundice, No lesions, No swelling    HEENT: No icterus , EOM full , No epistaxis    Abdo:   : Soft, Non tender, No guarding, No distension     Back    : No CVAT b/l    Extremity: No calf tenderness, No edema    Genitalia Female: No Carmichael    Neuro   : A&Ox3        LABS:                                         11.0     7.28  )-----------( 245      ( 12 Aug 2020 06:38 )               33.6         08-12        145  |  114<H>  |  15    ----------------------------<  83    3.9   |  26  |  0.69        Ca    8.0<L>      12 Aug 2020 06:38        TPro  7.4  /  Alb  3.1<L>  /  TBili  0.4  /  DBili  x   /  AST  17  /  ALT  18  /  AlkPhos  88  08-11                Assessment and Plan:             58 yo F with urosepsis (UA c/w infection, tachycardia, fever) from obstructing RIGHT ureteral stone. Pt now POD 2 s/p emergent RIGHT ureteral stent placement. Pt is doing well post op, tolerating stent. UCx >100,000 CFU/ml Klebsiella pneumoniae    Plan    - Discharge pt with Oxybutynin 5 mg Q8hrs PRN bladder spasms, Pyridium 200 mg Q8hrs PRN dysuria, Oxycodone PRN, Cipro 500 mg BID x 5 days and Flomax 0.4 mg QHS    - Follow up with Dr. De La Cruz in 1-2 weeks for stent and stone management

## 2020-08-12 NOTE — DISCHARGE NOTE PROVIDER - NSDCMRMEDTOKEN_GEN_ALL_CORE_FT
Cipro 500 mg oral tablet: 1 tab(s) orally every 12 hours   oxybutynin 5 mg oral tablet: 1 tab(s) orally every 6 hours, As needed, Bladder spasms  oxycodone-acetaminophen 5 mg-325 mg oral tablet: 1 tab(s) orally every 6 hours, As Needed -Moderate Pain (4 - 6) MDD:4  phenazopyridine 200 mg oral tablet: 1 tab(s) orally 3 times a day, As needed, dysuria  Synthroid 100 mcg (0.1 mg) oral tablet: 1 tab(s) orally once a day  tamsulosin 0.4 mg oral capsule: 1 cap(s) orally once a day (at bedtime)

## 2020-08-12 NOTE — PROGRESS NOTE ADULT - SUBJECTIVE AND OBJECTIVE BOX
Patient seen and examined at bedside. Patient is POD2 s/p right ureteral stent placement on 8/10/20. Patient reports she feels much better and denies any fevers, chills, n/v, abd/flank pain or dysuria. She is tolerating the stent well.    PE  General: No distress, No anxiety  Vital Signs Last 24 Hrs  T(C): 37.5 (12 Aug 2020 09:15), Max: 37.5 (12 Aug 2020 09:15)  T(F): 99.5 (12 Aug 2020 09:15), Max: 99.5 (12 Aug 2020 09:15)  HR: 103 (12 Aug 2020 09:15) (68 - 103)  BP: 109/49 (12 Aug 2020 09:15) (109/49 - 116/60)  BP(mean): 65 (12 Aug 2020 09:15) (65 - 65)  RR: 16 (12 Aug 2020 09:15) (16 - 16)  SpO2: 99% (12 Aug 2020 09:15) (95% - 99%)            Skin     : No jaundice, No lesions, No swelling  HEENT: No icterus , EOM full , No epistaxis  Abdo:   : Soft, Non tender, No guarding, No distension   Back    : No CVAT b/l  Extremity: No calf tenderness, No edema  Genitalia Female: No Carmichael  Neuro   : A&Ox3    LABS:                                   11.0   7.28  )-----------( 245      ( 12 Aug 2020 06:38 )             33.6     08-12    145  |  114<H>  |  15  ----------------------------<  83  3.9   |  26  |  0.69    Ca    8.0<L>      12 Aug 2020 06:38    TPro  7.4  /  Alb  3.1<L>  /  TBili  0.4  /  DBili  x   /  AST  17  /  ALT  18  /  AlkPhos  88  08-11      Urinalysis (08.10.20 @ 16:59)    Glucose Qualitative, Urine: Negative mg/dL    Blood, Urine: Large    pH Urine: 5.0    Color: Yellow    Urine Appearance: very cloudy    Bilirubin: Negative    Ketone - Urine: Small    Specific Gravity: 1.015    Protein, Urine: 100 mg/dL    Urobilinogen: Negative mg/dL    Nitrite: Positive    Leukocyte Esterase Concentration: Moderate    Culture - Urine (08.10.20 @ 16:54)    Specimen Source: .Urine Clean Catch (Midstream)    Culture Results:   >100,000 CFU/ml Klebsiella pneumoniae

## 2020-08-12 NOTE — DISCHARGE NOTE NURSING/CASE MANAGEMENT/SOCIAL WORK - PATIENT PORTAL LINK FT
You can access the FollowMyHealth Patient Portal offered by [facility] by registering at the following website: [email or web address]. By joining [facility]’s FollowMyHealth portal, you will also be able to view your health information using other applications (apps) compatible with our system.

## 2020-08-13 PROBLEM — E03.9 HYPOTHYROIDISM, UNSPECIFIED: Chronic | Status: ACTIVE | Noted: 2020-08-10

## 2020-08-13 PROBLEM — N81.4 UTEROVAGINAL PROLAPSE, UNSPECIFIED: Chronic | Status: ACTIVE | Noted: 2020-08-10

## 2020-08-14 DIAGNOSIS — N13.2 HYDRONEPHROSIS WITH RENAL AND URETERAL CALCULOUS OBSTRUCTION: ICD-10-CM

## 2020-08-14 DIAGNOSIS — E03.9 HYPOTHYROIDISM, UNSPECIFIED: ICD-10-CM

## 2020-08-14 DIAGNOSIS — A41.59 OTHER GRAM-NEGATIVE SEPSIS: ICD-10-CM

## 2020-08-14 DIAGNOSIS — N81.4 UTEROVAGINAL PROLAPSE, UNSPECIFIED: ICD-10-CM

## 2020-08-16 LAB
CULTURE RESULTS: SIGNIFICANT CHANGE UP
CULTURE RESULTS: SIGNIFICANT CHANGE UP
SPECIMEN SOURCE: SIGNIFICANT CHANGE UP
SPECIMEN SOURCE: SIGNIFICANT CHANGE UP

## 2020-08-18 ENCOUNTER — OUTPATIENT (OUTPATIENT)
Dept: OUTPATIENT SERVICES | Facility: HOSPITAL | Age: 60
LOS: 1 days | End: 2020-08-18
Payer: COMMERCIAL

## 2020-08-18 ENCOUNTER — EMERGENCY (EMERGENCY)
Facility: HOSPITAL | Age: 60
LOS: 0 days | Discharge: HOME | End: 2020-08-18
Attending: EMERGENCY MEDICINE
Payer: COMMERCIAL

## 2020-08-18 ENCOUNTER — APPOINTMENT (OUTPATIENT)
Dept: RADIOLOGY | Facility: CLINIC | Age: 60
End: 2020-08-18
Payer: COMMERCIAL

## 2020-08-18 ENCOUNTER — APPOINTMENT (OUTPATIENT)
Dept: UROLOGY | Facility: CLINIC | Age: 60
End: 2020-08-18
Payer: COMMERCIAL

## 2020-08-18 VITALS
DIASTOLIC BLOOD PRESSURE: 77 MMHG | OXYGEN SATURATION: 100 % | SYSTOLIC BLOOD PRESSURE: 135 MMHG | RESPIRATION RATE: 18 BRPM | HEART RATE: 88 BPM

## 2020-08-18 VITALS — WEIGHT: 169.98 LBS | HEIGHT: 64 IN

## 2020-08-18 DIAGNOSIS — R53.1 WEAKNESS: ICD-10-CM

## 2020-08-18 DIAGNOSIS — Z86.19 PERSONAL HISTORY OF OTHER INFECTIOUS AND PARASITIC DISEASES: ICD-10-CM

## 2020-08-18 DIAGNOSIS — K59.09 OTHER CONSTIPATION: ICD-10-CM

## 2020-08-18 DIAGNOSIS — Z00.8 ENCOUNTER FOR OTHER GENERAL EXAMINATION: ICD-10-CM

## 2020-08-18 DIAGNOSIS — Z87.42 PERSONAL HISTORY OF OTHER DISEASES OF THE FEMALE GENITAL TRACT: ICD-10-CM

## 2020-08-18 DIAGNOSIS — Z87.442 PERSONAL HISTORY OF URINARY CALCULI: ICD-10-CM

## 2020-08-18 DIAGNOSIS — Z96.0 PRESENCE OF UROGENITAL IMPLANTS: ICD-10-CM

## 2020-08-18 DIAGNOSIS — N81.4 UTEROVAGINAL PROLAPSE, UNSPECIFIED: ICD-10-CM

## 2020-08-18 DIAGNOSIS — E03.9 HYPOTHYROIDISM, UNSPECIFIED: ICD-10-CM

## 2020-08-18 DIAGNOSIS — R11.2 NAUSEA WITH VOMITING, UNSPECIFIED: ICD-10-CM

## 2020-08-18 DIAGNOSIS — K59.00 CONSTIPATION, UNSPECIFIED: ICD-10-CM

## 2020-08-18 LAB
ALBUMIN SERPL ELPH-MCNC: 3.7 G/DL — SIGNIFICANT CHANGE UP (ref 3.3–5)
ALP SERPL-CCNC: 84 U/L — SIGNIFICANT CHANGE UP (ref 40–120)
ALT FLD-CCNC: 27 U/L — SIGNIFICANT CHANGE UP (ref 12–78)
ANION GAP SERPL CALC-SCNC: 5 MMOL/L — SIGNIFICANT CHANGE UP (ref 5–17)
APPEARANCE UR: CLEAR — SIGNIFICANT CHANGE UP
AST SERPL-CCNC: 23 U/L — SIGNIFICANT CHANGE UP (ref 15–37)
BASOPHILS # BLD AUTO: 0.06 K/UL — SIGNIFICANT CHANGE UP (ref 0–0.2)
BASOPHILS NFR BLD AUTO: 0.8 % — SIGNIFICANT CHANGE UP (ref 0–2)
BILIRUB SERPL-MCNC: 0.3 MG/DL — SIGNIFICANT CHANGE UP (ref 0.2–1.2)
BILIRUB UR-MCNC: NEGATIVE — SIGNIFICANT CHANGE UP
BUN SERPL-MCNC: 13 MG/DL — SIGNIFICANT CHANGE UP (ref 7–23)
CALCIUM SERPL-MCNC: 9.2 MG/DL — SIGNIFICANT CHANGE UP (ref 8.5–10.1)
CHLORIDE SERPL-SCNC: 111 MMOL/L — HIGH (ref 96–108)
CO2 SERPL-SCNC: 25 MMOL/L — SIGNIFICANT CHANGE UP (ref 22–31)
COLOR SPEC: YELLOW — SIGNIFICANT CHANGE UP
CREAT SERPL-MCNC: 0.82 MG/DL — SIGNIFICANT CHANGE UP (ref 0.5–1.3)
DIFF PNL FLD: ABNORMAL
EOSINOPHIL # BLD AUTO: 0.15 K/UL — SIGNIFICANT CHANGE UP (ref 0–0.5)
EOSINOPHIL NFR BLD AUTO: 1.9 % — SIGNIFICANT CHANGE UP (ref 0–6)
GLUCOSE SERPL-MCNC: 93 MG/DL — SIGNIFICANT CHANGE UP (ref 70–99)
GLUCOSE UR QL: NEGATIVE MG/DL — SIGNIFICANT CHANGE UP
HCT VFR BLD CALC: 38.7 % — SIGNIFICANT CHANGE UP (ref 34.5–45)
HGB BLD-MCNC: 12.5 G/DL — SIGNIFICANT CHANGE UP (ref 11.5–15.5)
IMM GRANULOCYTES NFR BLD AUTO: 0.5 % — SIGNIFICANT CHANGE UP (ref 0–1.5)
KETONES UR-MCNC: NEGATIVE — SIGNIFICANT CHANGE UP
LEUKOCYTE ESTERASE UR-ACNC: ABNORMAL
LIDOCAIN IGE QN: 206 U/L — SIGNIFICANT CHANGE UP (ref 73–393)
LYMPHOCYTES # BLD AUTO: 1.32 K/UL — SIGNIFICANT CHANGE UP (ref 1–3.3)
LYMPHOCYTES # BLD AUTO: 16.7 % — SIGNIFICANT CHANGE UP (ref 13–44)
MCHC RBC-ENTMCNC: 28.9 PG — SIGNIFICANT CHANGE UP (ref 27–34)
MCHC RBC-ENTMCNC: 32.3 GM/DL — SIGNIFICANT CHANGE UP (ref 32–36)
MCV RBC AUTO: 89.4 FL — SIGNIFICANT CHANGE UP (ref 80–100)
MONOCYTES # BLD AUTO: 0.34 K/UL — SIGNIFICANT CHANGE UP (ref 0–0.9)
MONOCYTES NFR BLD AUTO: 4.3 % — SIGNIFICANT CHANGE UP (ref 2–14)
NEUTROPHILS # BLD AUTO: 5.98 K/UL — SIGNIFICANT CHANGE UP (ref 1.8–7.4)
NEUTROPHILS NFR BLD AUTO: 75.8 % — SIGNIFICANT CHANGE UP (ref 43–77)
NITRITE UR-MCNC: NEGATIVE — SIGNIFICANT CHANGE UP
PH UR: 6 — SIGNIFICANT CHANGE UP (ref 5–8)
PLATELET # BLD AUTO: 384 K/UL — SIGNIFICANT CHANGE UP (ref 150–400)
POTASSIUM SERPL-MCNC: 3.7 MMOL/L — SIGNIFICANT CHANGE UP (ref 3.5–5.3)
POTASSIUM SERPL-SCNC: 3.7 MMOL/L — SIGNIFICANT CHANGE UP (ref 3.5–5.3)
PROT SERPL-MCNC: 7.8 GM/DL — SIGNIFICANT CHANGE UP (ref 6–8.3)
PROT UR-MCNC: 30 MG/DL
RBC # BLD: 4.33 M/UL — SIGNIFICANT CHANGE UP (ref 3.8–5.2)
RBC # FLD: 13.5 % — SIGNIFICANT CHANGE UP (ref 10.3–14.5)
SODIUM SERPL-SCNC: 141 MMOL/L — SIGNIFICANT CHANGE UP (ref 135–145)
SP GR SPEC: 1.01 — SIGNIFICANT CHANGE UP (ref 1.01–1.02)
TSH SERPL-MCNC: 1.98 UU/ML — SIGNIFICANT CHANGE UP (ref 0.34–4.82)
UROBILINOGEN FLD QL: NEGATIVE MG/DL — SIGNIFICANT CHANGE UP
WBC # BLD: 7.89 K/UL — SIGNIFICANT CHANGE UP (ref 3.8–10.5)
WBC # FLD AUTO: 7.89 K/UL — SIGNIFICANT CHANGE UP (ref 3.8–10.5)

## 2020-08-18 PROCEDURE — 74018 RADEX ABDOMEN 1 VIEW: CPT

## 2020-08-18 PROCEDURE — 83690 ASSAY OF LIPASE: CPT

## 2020-08-18 PROCEDURE — 36415 COLL VENOUS BLD VENIPUNCTURE: CPT

## 2020-08-18 PROCEDURE — 80053 COMPREHEN METABOLIC PANEL: CPT

## 2020-08-18 PROCEDURE — 74177 CT ABD & PELVIS W/CONTRAST: CPT

## 2020-08-18 PROCEDURE — 99214 OFFICE O/P EST MOD 30 MIN: CPT

## 2020-08-18 PROCEDURE — 96374 THER/PROPH/DIAG INJ IV PUSH: CPT | Mod: XU

## 2020-08-18 PROCEDURE — 84443 ASSAY THYROID STIM HORMONE: CPT

## 2020-08-18 PROCEDURE — 99284 EMERGENCY DEPT VISIT MOD MDM: CPT | Mod: 25

## 2020-08-18 PROCEDURE — 85025 COMPLETE CBC W/AUTO DIFF WBC: CPT

## 2020-08-18 PROCEDURE — 81001 URINALYSIS AUTO W/SCOPE: CPT

## 2020-08-18 PROCEDURE — 74177 CT ABD & PELVIS W/CONTRAST: CPT | Mod: 26

## 2020-08-18 PROCEDURE — 74018 RADEX ABDOMEN 1 VIEW: CPT | Mod: 26

## 2020-08-18 PROCEDURE — 99284 EMERGENCY DEPT VISIT MOD MDM: CPT

## 2020-08-18 RX ORDER — SODIUM CHLORIDE 9 MG/ML
1000 INJECTION INTRAMUSCULAR; INTRAVENOUS; SUBCUTANEOUS ONCE
Refills: 0 | Status: COMPLETED | OUTPATIENT
Start: 2020-08-18 | End: 2020-08-18

## 2020-08-18 RX ORDER — ONDANSETRON 8 MG/1
4 TABLET, FILM COATED ORAL ONCE
Refills: 0 | Status: COMPLETED | OUTPATIENT
Start: 2020-08-18 | End: 2020-08-18

## 2020-08-18 RX ADMIN — ONDANSETRON 4 MILLIGRAM(S): 8 TABLET, FILM COATED ORAL at 19:12

## 2020-08-18 RX ADMIN — SODIUM CHLORIDE 1000 MILLILITER(S): 9 INJECTION INTRAMUSCULAR; INTRAVENOUS; SUBCUTANEOUS at 19:12

## 2020-08-18 NOTE — ED STATDOCS - NS_ ATTENDINGSCRIBEDETAILS _ED_A_ED_FT
I, Reynaldo Dorsey MD,  performed the initial face to face bedside interview with this patient regarding history of present illness, review of symptoms and relevant past medical, social and family history.  I completed an independent physical examination.  I was the initial provider who evaluated this patient.  The history, relevant review of systems, past medical and surgical history, medical decision making, and physical examination was documented by the scribe in my presence and I attest to the accuracy of the documentation.

## 2020-08-18 NOTE — ED STATDOCS - NSFOLLOWUPINSTRUCTIONS_ED_ALL_ED_FT
PLEASE FOLLOW UP WITH YOUR GI DOCTOR FOR ADDITIONAL EVALUATION. RETURN TO ED FOR WORSENING SYMPTOMS.     Constipation    WHAT YOU NEED TO KNOW:    Constipation is when you have hard, dry bowel movements, or you go longer than usual between bowel movements.     DISCHARGE INSTRUCTIONS:    Return to the emergency department if:     You have blood in your bowel movements.      You have a fever and abdominal pain with the constipation.    Contact your healthcare provider if:     Your constipation gets worse.       You start to vomit.      You have questions or concerns about your condition or care.    Medicines:     Medicine such as a laxative may help relax and loosen your intestines to help you have a bowel movement. Your provider may recommend you only use laxatives for a short time. Long-term use may make your bowels dependent on the medicine.      Take your medicine as directed. Contact your healthcare provider if you think your medicine is not helping or if you have side effects. Tell him of her if you are allergic to any medicine. Keep a list of the medicines, vitamins, and herbs you take. Include the amounts, and when and why you take them. Bring the list or the pill bottles to follow-up visits. Carry your medicine list with you in case of an emergency.    Relieve constipation:     A suppository may be used to help soften your bowel movements. This may make them easier to pass. A suppository is guided into your rectum through your anus.Suppository for Constipation           An enema is liquid medicine used to clear bowel movement from your rectum. The medicine is put into your rectum through your anus.Enemas         Prevent constipation:     Drink liquids as directed. You may need to drink extra liquids to help soften and move your bowels. Ask how much liquid to drink each day and which liquids are best for you.       Eat high-fiber foods. This may help decrease constipation by adding bulk to your bowel movements. High-fiber foods include fruit, vegetables, whole-grain breads and cereals, and beans. Your healthcare provider or dietitian can help you create a high-fiber meal plan. Your provider may also recommend a fiber supplement if you cannot get enough fiber from food.            Exercise regularly. Regular physical activity can help stimulate your intestines. Ask which exercises are best for you.      Schedule a time each day to have a bowel movement. This may help train your body to have regular bowel movements. Bend forward while you are on the toilet to help move the bowel movement out. Sit on the toilet for at least 10 minutes, even if you do not have a bowel movement.     Follow up with your healthcare provider as directed: Write down your questions so you remember to ask them during your visits.

## 2020-08-18 NOTE — ED STATDOCS - PROGRESS NOTE DETAILS
58 y/o f with PMH of hypothyroid, kidney stone with recent stent placement by Dr. De La Cruz presents with constipation x 10 days, nausae, vomiting & generalized weakness. Pt was seen by this provider on 8/10 at which time she was septic with urolithiasis, sent to OR for emergent ureter stent placement. States she has tried miralax, stool softeners and magnesium citrate for constipation. States she has passed small amount of stool. Describes vomit as "poop-like" in appearance. Denies fever, chills, dysuria, hematuria. PE: Well appearing. Cardiac: s1s2, RRR. Lungs: CTAB. Abdomen: NBS x4, soft, nontender. No CVAT. A/P: Constipation, r/o SBO, ileus. Plan for labs, IVF, CTAP, reassess. - Shahbaz Stewart PA-C Labs and CT reviewed. Advised to continue with bowel regiment at home. Advised to follow up with PCP for further evaluation. PO trial being performed at this time. Will dc pending ability to tolerate PO. - Shahbaz Stewart PA-C Patient tolerating PO. Plan for dc. - Shahbaz Stewart PA-C

## 2020-08-18 NOTE — ED ADULT NURSE NOTE - OBJECTIVE STATEMENT
Pt states she has been constipated and has been taking meds to alleviate it. Today she took Mag Citrate and began vomiting

## 2020-08-18 NOTE — ED STATDOCS - OBJECTIVE STATEMENT
58 y/o female with a PMHx of cystocele with prolapse, hypothyroidism on Synthroid, uterine prolapse, presents to the ED c/o constipation x10 days and generalized weakness, nausea, and vomiting today. Pt reports she was seen at  on 08/10 and Dx with calculus of ureter and sepsis and had surgery for ureter stent placement. Pt states she was seen for a follow-up today and after began with generalized weakness and had an episode of coffee ground emesis. Pt states she took citrate of magnesia today. Notes she has not had a BM in 10 days. Nonsmoker. No other complaints at this time. NKDA. PCP: Margarita Sanchez. Urologist: Dr. Keen. 58 y/o female with a PMHx of cystocele with prolapse, hypothyroidism on Synthroid, uterine prolapse, presents to the ED c/o constipation x10 days and generalized weakness, nausea, and vomiting today. Pt reports she was seen at  on 08/10 and Dx with calculus of ureter and sepsis and had surgery for ureter stent placement. Pt states she was seen for a follow-up today and after began with generalized weakness and had an episode of emesis ? feculent. Pt states she took citrate of magnesia today. Notes she has not had a BM in 10 days. Nonsmoker. No other complaints at this time. NKDA. PCP: Margarita Sanchez. Urologist: Dr. Keen.

## 2020-08-18 NOTE — ED STATDOCS - NS ED ROS FT
Constitutional: +generalized weakness. No fever or chills  Eyes: No visual changes  HEENT: No throat pain  CV: No chest pain  Resp: No SOB no cough  GI: +constipation +vomiting +nausea   : No dysuria  MSK: No musculoskeletal pain  Skin: No rash  Neuro: No headache

## 2020-08-18 NOTE — ED STATDOCS - PHYSICAL EXAMINATION
Constitutional: NAD AAOx3  Eyes: PERRLA EOMI  Head: Normocephalic atraumatic  Mouth: MMM  Cardiac: +mildly tachycardic   Resp: Lungs CTAB  GI: Abd s/nt/nd  Neuro: CN2-12 intact  Skin: No rashes Constitutional: NAD AAOx3  Eyes: PERRLA EOMI  Head: Normocephalic atraumatic  Mouth: MMM  Cardiac: +mildly tachycardic   Resp: Lungs CTAB  GI: Abd s/nt/nd no rebound or guarding  Neuro: CN2-12 intact  Skin: No rashes

## 2020-08-18 NOTE — HISTORY OF PRESENT ILLNESS
[FreeTextEntry1] : This patient presents for a check up after she had a right ureteral stent placed at [facility] for   6 mm right ureteral stone causing obstruction. She is tolerating the stent well and does not feel that she has passed the stone.

## 2020-08-18 NOTE — PHYSICAL EXAM
[Normal Appearance] : normal appearance [General Appearance - Well Nourished] : well nourished [General Appearance - Well Developed] : well developed [Well Groomed] : well groomed [Edema] : no peripheral edema [General Appearance - In No Acute Distress] : no acute distress [Respiration, Rhythm And Depth] : normal respiratory rhythm and effort [Abdomen Tenderness] : non-tender [Abdomen Soft] : soft [Exaggerated Use Of Accessory Muscles For Inspiration] : no accessory muscle use [Costovertebral Angle Tenderness] : no ~M costovertebral angle tenderness [Normal Station and Gait] : the gait and station were normal for the patient's age [Urinary Bladder Findings] : the bladder was normal on palpation [] : no rash [Oriented To Time, Place, And Person] : oriented to person, place, and time [No Focal Deficits] : no focal deficits [Affect] : the affect was normal [Not Anxious] : not anxious [Mood] : the mood was normal [No Palpable Adenopathy] : no palpable adenopathy

## 2020-08-18 NOTE — ED STATDOCS - CLINICAL SUMMARY MEDICAL DECISION MAKING FREE TEXT BOX
60 y/o female presents to the ED with nausea, vomiting in the setting of no BM for 10 days, recent surgery. Will r/o SBO/ileus, symptom control and reassess.

## 2020-08-18 NOTE — ED STATDOCS - PATIENT PORTAL LINK FT
You can access the FollowMyHealth Patient Portal offered by [facility] by registering at the following website: [email or web address]. By joining [facility]’s FollowMyHealth portal, you will also be able to view your health information using other applications (apps) compatible with our system.

## 2020-08-18 NOTE — ED ADULT TRIAGE NOTE - CHIEF COMPLAINT QUOTE
c/o constipation for past 10 days, and started vomiting coffee ground emesis today after taking citrate of magnesia, s/p ureter stent placement for kidney stone and sepsis, c/o associated weakness, HR in triage 117 O2 sat 96% on RA

## 2020-08-18 NOTE — ASSESSMENT
[FreeTextEntry1] : This patient presents with a right ureteral stent in place for a 6 mm obstructing right ureteral stone. Labs and Xray are ordered. Discussed with patient if the stone is seen on Xray then she may need a ureteroscopy lithotripsy.

## 2020-08-20 LAB
APPEARANCE: ABNORMAL
BACTERIA UR CULT: NORMAL
BACTERIA: NEGATIVE
BASOPHILS # BLD AUTO: 0.07 K/UL
BASOPHILS NFR BLD AUTO: 1 %
BILIRUBIN URINE: NEGATIVE
BLOOD URINE: NORMAL
COLOR: NORMAL
EOSINOPHIL # BLD AUTO: 0.24 K/UL
EOSINOPHIL NFR BLD AUTO: 3.4 %
GLUCOSE QUALITATIVE U: NEGATIVE
HCT VFR BLD CALC: 40.2 %
HGB BLD-MCNC: 12.6 G/DL
HYALINE CASTS: 1 /LPF
IMM GRANULOCYTES NFR BLD AUTO: 0.4 %
KETONES URINE: NEGATIVE
LEUKOCYTE ESTERASE URINE: NEGATIVE
LYMPHOCYTES # BLD AUTO: 1.92 K/UL
LYMPHOCYTES NFR BLD AUTO: 27.5 %
MAN DIFF?: NORMAL
MCHC RBC-ENTMCNC: 29 PG
MCHC RBC-ENTMCNC: 31.3 GM/DL
MCV RBC AUTO: 92.4 FL
MICROSCOPIC-UA: NORMAL
MONOCYTES # BLD AUTO: 0.38 K/UL
MONOCYTES NFR BLD AUTO: 5.5 %
NEUTROPHILS # BLD AUTO: 4.33 K/UL
NEUTROPHILS NFR BLD AUTO: 62.2 %
NITRITE URINE: NEGATIVE
PH URINE: 6.5
PLATELET # BLD AUTO: 402 K/UL
PROTEIN URINE: ABNORMAL
RBC # BLD: 4.35 M/UL
RBC # FLD: 13.8 %
RED BLOOD CELLS URINE: 1 /HPF
SPECIFIC GRAVITY URINE: 1
SQUAMOUS EPITHELIAL CELLS: 3 /HPF
URINE CYTOLOGY: NORMAL
UROBILINOGEN URINE: NORMAL
WBC # FLD AUTO: 6.97 K/UL
WHITE BLOOD CELLS URINE: 2 /HPF

## 2020-09-14 DIAGNOSIS — N20.1 CALCULUS OF URETER: ICD-10-CM

## 2020-09-16 ENCOUNTER — OUTPATIENT (OUTPATIENT)
Dept: OUTPATIENT SERVICES | Facility: HOSPITAL | Age: 60
LOS: 1 days | End: 2020-09-16
Payer: COMMERCIAL

## 2020-09-16 ENCOUNTER — APPOINTMENT (OUTPATIENT)
Dept: CT IMAGING | Facility: CLINIC | Age: 60
End: 2020-09-16
Payer: COMMERCIAL

## 2020-09-16 ENCOUNTER — RESULT REVIEW (OUTPATIENT)
Age: 60
End: 2020-09-16

## 2020-09-16 DIAGNOSIS — N20.1 CALCULUS OF URETER: ICD-10-CM

## 2020-09-16 PROCEDURE — 74176 CT ABD & PELVIS W/O CONTRAST: CPT | Mod: 26

## 2020-09-16 PROCEDURE — 74176 CT ABD & PELVIS W/O CONTRAST: CPT

## 2020-09-22 ENCOUNTER — RESULT REVIEW (OUTPATIENT)
Age: 60
End: 2020-09-22

## 2020-09-22 ENCOUNTER — OUTPATIENT (OUTPATIENT)
Dept: OUTPATIENT SERVICES | Facility: HOSPITAL | Age: 60
LOS: 1 days | End: 2020-09-22
Payer: COMMERCIAL

## 2020-09-22 VITALS
RESPIRATION RATE: 18 BRPM | DIASTOLIC BLOOD PRESSURE: 81 MMHG | HEART RATE: 107 BPM | WEIGHT: 175.05 LBS | OXYGEN SATURATION: 97 % | HEIGHT: 63.5 IN | SYSTOLIC BLOOD PRESSURE: 104 MMHG | TEMPERATURE: 100 F

## 2020-09-22 DIAGNOSIS — Z01.818 ENCOUNTER FOR OTHER PREPROCEDURAL EXAMINATION: ICD-10-CM

## 2020-09-22 DIAGNOSIS — N20.1 CALCULUS OF URETER: ICD-10-CM

## 2020-09-22 DIAGNOSIS — Z98.890 OTHER SPECIFIED POSTPROCEDURAL STATES: Chronic | ICD-10-CM

## 2020-09-22 DIAGNOSIS — Z96.0 PRESENCE OF UROGENITAL IMPLANTS: Chronic | ICD-10-CM

## 2020-09-22 LAB
ANION GAP SERPL CALC-SCNC: 4 MMOL/L — LOW (ref 5–17)
APPEARANCE UR: CLEAR — SIGNIFICANT CHANGE UP
APTT BLD: 32.4 SEC — SIGNIFICANT CHANGE UP (ref 27.5–35.5)
BASOPHILS # BLD AUTO: 0.05 K/UL — SIGNIFICANT CHANGE UP (ref 0–0.2)
BASOPHILS NFR BLD AUTO: 0.7 % — SIGNIFICANT CHANGE UP (ref 0–2)
BILIRUB UR-MCNC: NEGATIVE — SIGNIFICANT CHANGE UP
BUN SERPL-MCNC: 8 MG/DL — SIGNIFICANT CHANGE UP (ref 7–23)
CALCIUM SERPL-MCNC: 9.4 MG/DL — SIGNIFICANT CHANGE UP (ref 8.5–10.1)
CHLORIDE SERPL-SCNC: 107 MMOL/L — SIGNIFICANT CHANGE UP (ref 96–108)
CO2 SERPL-SCNC: 29 MMOL/L — SIGNIFICANT CHANGE UP (ref 22–31)
COLOR SPEC: YELLOW — SIGNIFICANT CHANGE UP
CREAT SERPL-MCNC: 0.8 MG/DL — SIGNIFICANT CHANGE UP (ref 0.5–1.3)
DIFF PNL FLD: ABNORMAL
EOSINOPHIL # BLD AUTO: 0.11 K/UL — SIGNIFICANT CHANGE UP (ref 0–0.5)
EOSINOPHIL NFR BLD AUTO: 1.5 % — SIGNIFICANT CHANGE UP (ref 0–6)
GLUCOSE SERPL-MCNC: 90 MG/DL — SIGNIFICANT CHANGE UP (ref 70–99)
GLUCOSE UR QL: NEGATIVE MG/DL — SIGNIFICANT CHANGE UP
HCT VFR BLD CALC: 40.7 % — SIGNIFICANT CHANGE UP (ref 34.5–45)
HGB BLD-MCNC: 13 G/DL — SIGNIFICANT CHANGE UP (ref 11.5–15.5)
IMM GRANULOCYTES NFR BLD AUTO: 0.3 % — SIGNIFICANT CHANGE UP (ref 0–1.5)
INR BLD: 1.07 RATIO — SIGNIFICANT CHANGE UP (ref 0.88–1.16)
KETONES UR-MCNC: NEGATIVE — SIGNIFICANT CHANGE UP
LEUKOCYTE ESTERASE UR-ACNC: ABNORMAL
LYMPHOCYTES # BLD AUTO: 0.87 K/UL — LOW (ref 1–3.3)
LYMPHOCYTES # BLD AUTO: 11.5 % — LOW (ref 13–44)
MCHC RBC-ENTMCNC: 28.8 PG — SIGNIFICANT CHANGE UP (ref 27–34)
MCHC RBC-ENTMCNC: 31.9 GM/DL — LOW (ref 32–36)
MCV RBC AUTO: 90.2 FL — SIGNIFICANT CHANGE UP (ref 80–100)
MONOCYTES # BLD AUTO: 0.41 K/UL — SIGNIFICANT CHANGE UP (ref 0–0.9)
MONOCYTES NFR BLD AUTO: 5.4 % — SIGNIFICANT CHANGE UP (ref 2–14)
NEUTROPHILS # BLD AUTO: 6.08 K/UL — SIGNIFICANT CHANGE UP (ref 1.8–7.4)
NEUTROPHILS NFR BLD AUTO: 80.6 % — HIGH (ref 43–77)
NITRITE UR-MCNC: NEGATIVE — SIGNIFICANT CHANGE UP
PH UR: 8 — SIGNIFICANT CHANGE UP (ref 5–8)
PLATELET # BLD AUTO: 324 K/UL — SIGNIFICANT CHANGE UP (ref 150–400)
POTASSIUM SERPL-MCNC: 4.2 MMOL/L — SIGNIFICANT CHANGE UP (ref 3.5–5.3)
POTASSIUM SERPL-SCNC: 4.2 MMOL/L — SIGNIFICANT CHANGE UP (ref 3.5–5.3)
PROT UR-MCNC: 30 MG/DL
PROTHROM AB SERPL-ACNC: 12.5 SEC — SIGNIFICANT CHANGE UP (ref 10.6–13.6)
RBC # BLD: 4.51 M/UL — SIGNIFICANT CHANGE UP (ref 3.8–5.2)
RBC # FLD: 14.1 % — SIGNIFICANT CHANGE UP (ref 10.3–14.5)
SODIUM SERPL-SCNC: 140 MMOL/L — SIGNIFICANT CHANGE UP (ref 135–145)
SP GR SPEC: 1.01 — SIGNIFICANT CHANGE UP (ref 1.01–1.02)
UROBILINOGEN FLD QL: NEGATIVE MG/DL — SIGNIFICANT CHANGE UP
WBC # BLD: 7.54 K/UL — SIGNIFICANT CHANGE UP (ref 3.8–10.5)
WBC # FLD AUTO: 7.54 K/UL — SIGNIFICANT CHANGE UP (ref 3.8–10.5)

## 2020-09-22 PROCEDURE — 86850 RBC ANTIBODY SCREEN: CPT

## 2020-09-22 PROCEDURE — G0463: CPT | Mod: 25

## 2020-09-22 PROCEDURE — 93010 ELECTROCARDIOGRAM REPORT: CPT

## 2020-09-22 PROCEDURE — 71046 X-RAY EXAM CHEST 2 VIEWS: CPT | Mod: 26

## 2020-09-22 PROCEDURE — 36415 COLL VENOUS BLD VENIPUNCTURE: CPT

## 2020-09-22 PROCEDURE — 86900 BLOOD TYPING SEROLOGIC ABO: CPT

## 2020-09-22 PROCEDURE — 87186 SC STD MICRODIL/AGAR DIL: CPT

## 2020-09-22 PROCEDURE — 81001 URINALYSIS AUTO W/SCOPE: CPT

## 2020-09-22 PROCEDURE — 85610 PROTHROMBIN TIME: CPT

## 2020-09-22 PROCEDURE — 86901 BLOOD TYPING SEROLOGIC RH(D): CPT

## 2020-09-22 PROCEDURE — 71046 X-RAY EXAM CHEST 2 VIEWS: CPT

## 2020-09-22 PROCEDURE — 93005 ELECTROCARDIOGRAM TRACING: CPT

## 2020-09-22 PROCEDURE — 87086 URINE CULTURE/COLONY COUNT: CPT

## 2020-09-22 PROCEDURE — 80048 BASIC METABOLIC PNL TOTAL CA: CPT

## 2020-09-22 PROCEDURE — 85025 COMPLETE CBC W/AUTO DIFF WBC: CPT

## 2020-09-22 PROCEDURE — 85730 THROMBOPLASTIN TIME PARTIAL: CPT

## 2020-09-22 RX ORDER — LEVOTHYROXINE SODIUM 125 MCG
1 TABLET ORAL
Qty: 0 | Refills: 0 | DISCHARGE

## 2020-09-22 NOTE — H&P PST ADULT - GASTROINTESTINAL DETAILS
no rigidity/no masses palpable/bowel sounds normal/nontender/soft/no bruit/no rebound tenderness/no organomegaly/no guarding/no distention/normal

## 2020-09-22 NOTE — H&P PST ADULT - HISTORY OF PRESENT ILLNESS
59 years old female with ureterolithiasis. Patient was sent to the ER on August 10, 2020 with elevated temperature and lethargy. Patient was diagnosed with sepsis. CT scan indicated "a very large kidney stone on the right". Stent placed on the same day. Treated with antibiotics. Scan repeated 8/18/2020 and 9/16/2020. Stone still present . Denies pain, burning with urination. Admits to soreness in abdomen and "radiating ache".  Planned stone extraction.  ,

## 2020-09-22 NOTE — H&P PST ADULT - NSICDXPASTSURGICALHX_GEN_ALL_CORE_FT
PAST SURGICAL HISTORY:  History of colonoscopy 2017    History of endoscopy upper endoscopy    Status post placement of ureteral stent 8/10/2020    Vaginal pessary in situ 2005

## 2020-09-22 NOTE — H&P PST ADULT - ASSESSMENT
59 years old female present to PST prior to cystoscopy, ureteroscopy, stone extraction with Dr. De La Cruz.    Plan   1. NPO after midnight  2. Take the following medications with sips of water on the day of procedure: Synthroid  3. Drink a quart of extra  fluids the day before your surgery.  4. Medical optimization for surgery with Dr. Day  7. CBC, BMP, PT/ INR and PTT, Urinalysis, Urine Culture, Type and Screen sent to lab  8. EKG and Chest x- ray done  9. Covid swab 9/27/2020 followed by self quarantine

## 2020-09-22 NOTE — H&P PST ADULT - NSICDXPASTMEDICALHX_GEN_ALL_CORE_FT
PAST MEDICAL HISTORY:  Barretts syndrome Pre    Calcium ureterolithiasis     Cystocele with prolapse     GERD (gastroesophageal reflux disease)     Hemorrhoids     Hypothyroidism     Prolapse of female pelvic organs     Urinary incontinence in female     Uterine prolapse

## 2020-09-22 NOTE — H&P PST ADULT - NSICDXFAMILYHX_GEN_ALL_CORE_FT
FAMILY HISTORY:  FH: coronary artery disease, mother, father  FH: diabetes mellitus, maternal uncle, brother

## 2020-09-23 ENCOUNTER — INPATIENT (INPATIENT)
Facility: HOSPITAL | Age: 60
LOS: 6 days | Discharge: HOME | DRG: 659 | End: 2020-09-30
Attending: INTERNAL MEDICINE | Admitting: INTERNAL MEDICINE
Payer: COMMERCIAL

## 2020-09-23 VITALS — WEIGHT: 175.05 LBS | HEIGHT: 64 IN

## 2020-09-23 DIAGNOSIS — Z98.890 OTHER SPECIFIED POSTPROCEDURAL STATES: Chronic | ICD-10-CM

## 2020-09-23 DIAGNOSIS — N13.6 PYONEPHROSIS: ICD-10-CM

## 2020-09-23 DIAGNOSIS — N39.0 URINARY TRACT INFECTION, SITE NOT SPECIFIED: ICD-10-CM

## 2020-09-23 DIAGNOSIS — E03.9 HYPOTHYROIDISM, UNSPECIFIED: ICD-10-CM

## 2020-09-23 DIAGNOSIS — A41.9 SEPSIS, UNSPECIFIED ORGANISM: ICD-10-CM

## 2020-09-23 DIAGNOSIS — Z83.3 FAMILY HISTORY OF DIABETES MELLITUS: ICD-10-CM

## 2020-09-23 DIAGNOSIS — Z96.0 PRESENCE OF UROGENITAL IMPLANTS: Chronic | ICD-10-CM
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
## 2020-09-23 DIAGNOSIS — N20.1 CALCULUS OF URETER: ICD-10-CM

## 2020-09-23 DIAGNOSIS — T83.592A INFECTION AND INFLAMMATORY REACTION DUE TO INDWELLING URETERAL STENT, INITIAL ENCOUNTER: ICD-10-CM

## 2020-09-23 DIAGNOSIS — Z82.49 FAMILY HISTORY OF ISCHEMIC HEART DISEASE AND OTHER DISEASES OF THE CIRCULATORY SYSTEM: ICD-10-CM

## 2020-09-23 DIAGNOSIS — B95.2 ENTEROCOCCUS AS THE CAUSE OF DISEASES CLASSIFIED ELSEWHERE: ICD-10-CM

## 2020-09-23 DIAGNOSIS — Z01.818 ENCOUNTER FOR OTHER PREPROCEDURAL EXAMINATION: ICD-10-CM
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
## 2020-09-23 DIAGNOSIS — R65.20 SEVERE SEPSIS WITHOUT SEPTIC SHOCK: ICD-10-CM
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
## 2020-09-23 DIAGNOSIS — K21.9 GASTRO-ESOPHAGEAL REFLUX DISEASE WITHOUT ESOPHAGITIS: ICD-10-CM

## 2020-09-23 LAB
APPEARANCE UR: CLEAR — SIGNIFICANT CHANGE UP
APTT BLD: 27.8 SEC — SIGNIFICANT CHANGE UP (ref 27.5–35.5)
BASOPHILS # BLD AUTO: 0.04 K/UL — SIGNIFICANT CHANGE UP (ref 0–0.2)
BASOPHILS NFR BLD AUTO: 0.3 % — SIGNIFICANT CHANGE UP (ref 0–2)
BILIRUB UR-MCNC: NEGATIVE — SIGNIFICANT CHANGE UP
COLOR SPEC: YELLOW — SIGNIFICANT CHANGE UP
DIFF PNL FLD: ABNORMAL
EOSINOPHIL # BLD AUTO: 0.01 K/UL — SIGNIFICANT CHANGE UP (ref 0–0.5)
EOSINOPHIL NFR BLD AUTO: 0.1 % — SIGNIFICANT CHANGE UP (ref 0–6)
GLUCOSE UR QL: NEGATIVE MG/DL — SIGNIFICANT CHANGE UP
HCT VFR BLD CALC: 41.2 % — SIGNIFICANT CHANGE UP (ref 34.5–45)
HGB BLD-MCNC: 13.7 G/DL — SIGNIFICANT CHANGE UP (ref 11.5–15.5)
IMM GRANULOCYTES NFR BLD AUTO: 0.3 % — SIGNIFICANT CHANGE UP (ref 0–1.5)
INR BLD: 1.21 RATIO — HIGH (ref 0.88–1.16)
KETONES UR-MCNC: ABNORMAL
LACTATE SERPL-SCNC: 1.4 MMOL/L — SIGNIFICANT CHANGE UP (ref 0.7–2)
LEUKOCYTE ESTERASE UR-ACNC: ABNORMAL
LYMPHOCYTES # BLD AUTO: 1.5 K/UL — SIGNIFICANT CHANGE UP (ref 1–3.3)
LYMPHOCYTES # BLD AUTO: 10.5 % — LOW (ref 13–44)
MCHC RBC-ENTMCNC: 29.1 PG — SIGNIFICANT CHANGE UP (ref 27–34)
MCHC RBC-ENTMCNC: 33.3 GM/DL — SIGNIFICANT CHANGE UP (ref 32–36)
MCV RBC AUTO: 87.5 FL — SIGNIFICANT CHANGE UP (ref 80–100)
MONOCYTES # BLD AUTO: 1.16 K/UL — HIGH (ref 0–0.9)
MONOCYTES NFR BLD AUTO: 8.1 % — SIGNIFICANT CHANGE UP (ref 2–14)
NEUTROPHILS # BLD AUTO: 11.55 K/UL — HIGH (ref 1.8–7.4)
NEUTROPHILS NFR BLD AUTO: 80.7 % — HIGH (ref 43–77)
NITRITE UR-MCNC: NEGATIVE — SIGNIFICANT CHANGE UP
PH UR: 6 — SIGNIFICANT CHANGE UP (ref 5–8)
PLATELET # BLD AUTO: 301 K/UL — SIGNIFICANT CHANGE UP (ref 150–400)
PROT UR-MCNC: 30 MG/DL
PROTHROM AB SERPL-ACNC: 13.9 SEC — HIGH (ref 10.6–13.6)
RBC # BLD: 4.71 M/UL — SIGNIFICANT CHANGE UP (ref 3.8–5.2)
RBC # FLD: 13.9 % — SIGNIFICANT CHANGE UP (ref 10.3–14.5)
SP GR SPEC: 1.01 — SIGNIFICANT CHANGE UP (ref 1.01–1.02)
UROBILINOGEN FLD QL: 1 MG/DL
WBC # BLD: 14.31 K/UL — HIGH (ref 3.8–10.5)
WBC # FLD AUTO: 14.31 K/UL — HIGH (ref 3.8–10.5)

## 2020-09-23 PROCEDURE — 74176 CT ABD & PELVIS W/O CONTRAST: CPT | Mod: 26

## 2020-09-23 PROCEDURE — 93010 ELECTROCARDIOGRAM REPORT: CPT

## 2020-09-23 RX ORDER — ONDANSETRON 8 MG/1
4 TABLET, FILM COATED ORAL ONCE
Refills: 0 | Status: COMPLETED | OUTPATIENT
Start: 2020-09-23 | End: 2020-09-23

## 2020-09-23 RX ORDER — SODIUM CHLORIDE 9 MG/ML
2500 INJECTION INTRAMUSCULAR; INTRAVENOUS; SUBCUTANEOUS ONCE
Refills: 0 | Status: COMPLETED | OUTPATIENT
Start: 2020-09-23 | End: 2020-09-23

## 2020-09-23 RX ORDER — VANCOMYCIN HCL 1 G
1250 VIAL (EA) INTRAVENOUS ONCE
Refills: 0 | Status: COMPLETED | OUTPATIENT
Start: 2020-09-23 | End: 2020-09-24

## 2020-09-23 RX ORDER — PIPERACILLIN AND TAZOBACTAM 4; .5 G/20ML; G/20ML
3.38 INJECTION, POWDER, LYOPHILIZED, FOR SOLUTION INTRAVENOUS ONCE
Refills: 0 | Status: COMPLETED | OUTPATIENT
Start: 2020-09-23 | End: 2020-09-23

## 2020-09-23 RX ORDER — VANCOMYCIN HCL 1 G
1000 VIAL (EA) INTRAVENOUS ONCE
Refills: 0 | Status: DISCONTINUED | OUTPATIENT
Start: 2020-09-23 | End: 2020-09-23

## 2020-09-23 RX ORDER — ACETAMINOPHEN 500 MG
1000 TABLET ORAL ONCE
Refills: 0 | Status: COMPLETED | OUTPATIENT
Start: 2020-09-23 | End: 2020-09-23

## 2020-09-23 RX ADMIN — Medication 1000 MILLIGRAM(S): at 23:53

## 2020-09-23 RX ADMIN — Medication 1000 MILLIGRAM(S): at 23:31

## 2020-09-23 RX ADMIN — ONDANSETRON 4 MILLIGRAM(S): 8 TABLET, FILM COATED ORAL at 23:32

## 2020-09-23 RX ADMIN — PIPERACILLIN AND TAZOBACTAM 200 GRAM(S): 4; .5 INJECTION, POWDER, LYOPHILIZED, FOR SOLUTION INTRAVENOUS at 23:30

## 2020-09-23 RX ADMIN — SODIUM CHLORIDE 2500 MILLILITER(S): 9 INJECTION INTRAMUSCULAR; INTRAVENOUS; SUBCUTANEOUS at 23:30

## 2020-09-23 NOTE — ED PROVIDER NOTE - FAMILY HISTORY
FH: diabetes mellitus, maternal uncle, brother     Father  Still living? Unknown  FH: coronary artery disease, Age at diagnosis: Age Unknown     Mother  Still living? Unknown  FH: coronary artery disease, Age at diagnosis: Age Unknown

## 2020-09-23 NOTE — ED ADULT NURSE NOTE - CHIEF COMPLAINT QUOTE
Pt presents to ED c/o vomiting and abdominal pain that started 20 min PTA. Pt did not take anything for pain. Pt had stent placed 2 months ago for kidney stone, pt is scheduled for surgery 9/30 to have kidney stone removed and stent. Pt went to presurgical testing yesterday. temp 100.9;

## 2020-09-23 NOTE — ED PROVIDER NOTE - PSH
History of colonoscopy  2017  History of endoscopy  upper endoscopy  Status post placement of ureteral stent  8/10/2020  Vaginal pessary in situ  2005

## 2020-09-23 NOTE — ED PROVIDER NOTE - CLINICAL SUMMARY MEDICAL DECISION MAKING FREE TEXT BOX
Pt p/w SIRS criteria and urinary symptoms with h/o recent kidney stone and ureteral stent.  Code sepsis initiated: broad spectrum abx, 30 cc/kg bolus, labs, lactate, blood cultures, admit with urology consult

## 2020-09-23 NOTE — ED ADULT NURSE NOTE - NSIMPLEMENTINTERV_GEN_ALL_ED
Implemented All Universal Safety Interventions:  [place] to call system. Call bell, personal items and telephone within reach. Instruct patient to call for assistance. Room bathroom lighting operational. Non-slip footwear when patient is off stretcher. Physically safe environment: no spills, clutter or unnecessary equipment. Stretcher in lowest position, wheels locked, appropriate side rails in place.

## 2020-09-23 NOTE — ED ADULT TRIAGE NOTE - CHIEF COMPLAINT QUOTE
Pt presents to ED c/o vomiting and abdominal pain that started 20 min PTA. Pt did not take anything for pain. Pt had stent placed 2 months ago for kidney stone, pt is scheduled for surgery 9/30 to have kidney stone removed and stent. Pt went to presurgical testing yesterday. Pt presents to ED c/o vomiting and abdominal pain that started 20 min PTA. Pt did not take anything for pain. Pt had stent placed 2 months ago for kidney stone, pt is scheduled for surgery 9/30 to have kidney stone removed and stent. Pt went to presurgical testing yesterday. temp 100.9;

## 2020-09-23 NOTE — ED PROVIDER NOTE - PMH
Barretts syndrome  Pre  Calcium ureterolithiasis    Cystocele with prolapse    GERD (gastroesophageal reflux disease)    Hemorrhoids    Hypothyroidism    Prolapse of female pelvic organs    Urinary incontinence in female    Uterine prolapse

## 2020-09-23 NOTE — ED PROVIDER NOTE - PROGRESS NOTE DETAILS
Dr. Ramirez who is a urology fellow for Dr. De La Cruz presented with case.  He states to admit to medicine to treat sepsis and Dr. De La Cruz will likely remove the stent as planned once she is medically cleared.  Uday Lee, DO

## 2020-09-23 NOTE — ED ADULT NURSE NOTE - OBJECTIVE STATEMENT
pt. presents from home with abdominal pain and generalized weakness, pt. states she has not been feeling well lately. pt. presents from home with abdominal pain, fevers and generalized weakness, pt. states she has not been feeling well lately. sp renal stent august 10th. max temp: 100.6  pt. is AOx4 VSS.

## 2020-09-23 NOTE — ED PROVIDER NOTE - CARE PLAN
Principal Discharge DX:	Sepsis, due to unspecified organism, unspecified whether acute organ dysfunction present  Secondary Diagnosis:	Urinary tract infection without hematuria, site unspecified

## 2020-09-23 NOTE — ED PROVIDER NOTE - OBJECTIVE STATEMENT
60 y/o F with h/o admission from 8/10-8/12 for urosepsis with right ureteral stone admitted to Dr. Flex De La Cruz who placed a stent and d/c her on Cipro p/w recurrent symptoms of weakness/fatigue, n/v/ and abdominal pain progressively worsening over the last 24 hours.  Pt had pre-op labs collected yesterday for stent removal and her PCP noted leukocytosis and advised her to come to the ED.  Pt notes NBNB emesis over the last 24 hours.  Pt denies cough, CP or SOB.  She feels suprapubic pressure without flank pain currently.

## 2020-09-24 DIAGNOSIS — N39.0 URINARY TRACT INFECTION, SITE NOT SPECIFIED: ICD-10-CM

## 2020-09-24 DIAGNOSIS — A41.9 SEPSIS, UNSPECIFIED ORGANISM: ICD-10-CM

## 2020-09-24 PROBLEM — N20.1 CALCULUS OF URETER: Chronic | Status: ACTIVE | Noted: 2020-09-22

## 2020-09-24 PROBLEM — R32 UNSPECIFIED URINARY INCONTINENCE: Chronic | Status: ACTIVE | Noted: 2020-09-22

## 2020-09-24 PROBLEM — K64.9 UNSPECIFIED HEMORRHOIDS: Chronic | Status: ACTIVE | Noted: 2020-09-22

## 2020-09-24 PROBLEM — N81.9 FEMALE GENITAL PROLAPSE, UNSPECIFIED: Chronic | Status: ACTIVE | Noted: 2020-09-22

## 2020-09-24 PROBLEM — K21.9 GASTRO-ESOPHAGEAL REFLUX DISEASE WITHOUT ESOPHAGITIS: Chronic | Status: ACTIVE | Noted: 2020-09-22

## 2020-09-24 LAB
-  AMPICILLIN: SIGNIFICANT CHANGE UP
-  CIPROFLOXACIN: SIGNIFICANT CHANGE UP
-  LEVOFLOXACIN: SIGNIFICANT CHANGE UP
-  NITROFURANTOIN: SIGNIFICANT CHANGE UP
-  TETRACYCLINE: SIGNIFICANT CHANGE UP
-  VANCOMYCIN: SIGNIFICANT CHANGE UP
ALBUMIN SERPL ELPH-MCNC: 3.6 G/DL — SIGNIFICANT CHANGE UP (ref 3.3–5)
ALP SERPL-CCNC: 108 U/L — SIGNIFICANT CHANGE UP (ref 40–120)
ALT FLD-CCNC: 28 U/L — SIGNIFICANT CHANGE UP (ref 12–78)
ANION GAP SERPL CALC-SCNC: 9 MMOL/L — SIGNIFICANT CHANGE UP (ref 5–17)
AST SERPL-CCNC: 32 U/L — SIGNIFICANT CHANGE UP (ref 15–37)
BILIRUB SERPL-MCNC: 0.9 MG/DL — SIGNIFICANT CHANGE UP (ref 0.2–1.2)
BUN SERPL-MCNC: 15 MG/DL — SIGNIFICANT CHANGE UP (ref 7–23)
CALCIUM SERPL-MCNC: 9.7 MG/DL — SIGNIFICANT CHANGE UP (ref 8.5–10.1)
CHLORIDE SERPL-SCNC: 102 MMOL/L — SIGNIFICANT CHANGE UP (ref 96–108)
CO2 SERPL-SCNC: 24 MMOL/L — SIGNIFICANT CHANGE UP (ref 22–31)
CREAT SERPL-MCNC: 1.06 MG/DL — SIGNIFICANT CHANGE UP (ref 0.5–1.3)
CULTURE RESULTS: SIGNIFICANT CHANGE UP
GLUCOSE SERPL-MCNC: 135 MG/DL — HIGH (ref 70–99)
METHOD TYPE: SIGNIFICANT CHANGE UP
ORGANISM # SPEC MICROSCOPIC CNT: SIGNIFICANT CHANGE UP
ORGANISM # SPEC MICROSCOPIC CNT: SIGNIFICANT CHANGE UP
POTASSIUM SERPL-MCNC: 3.8 MMOL/L — SIGNIFICANT CHANGE UP (ref 3.5–5.3)
POTASSIUM SERPL-SCNC: 3.8 MMOL/L — SIGNIFICANT CHANGE UP (ref 3.5–5.3)
PROT SERPL-MCNC: 8.4 GM/DL — HIGH (ref 6–8.3)
SARS-COV-2 RNA SPEC QL NAA+PROBE: SIGNIFICANT CHANGE UP
SODIUM SERPL-SCNC: 135 MMOL/L — SIGNIFICANT CHANGE UP (ref 135–145)
SPECIMEN SOURCE: SIGNIFICANT CHANGE UP
TSH SERPL-MCNC: 0.15 UU/ML — LOW (ref 0.34–4.82)

## 2020-09-24 PROCEDURE — 99222 1ST HOSP IP/OBS MODERATE 55: CPT

## 2020-09-24 PROCEDURE — 80048 BASIC METABOLIC PNL TOTAL CA: CPT

## 2020-09-24 PROCEDURE — 85027 COMPLETE CBC AUTOMATED: CPT

## 2020-09-24 PROCEDURE — C1769: CPT

## 2020-09-24 PROCEDURE — 82365 CALCULUS SPECTROSCOPY: CPT

## 2020-09-24 PROCEDURE — 84436 ASSAY OF TOTAL THYROXINE: CPT

## 2020-09-24 PROCEDURE — C2617: CPT

## 2020-09-24 PROCEDURE — U0003: CPT

## 2020-09-24 PROCEDURE — 88300 SURGICAL PATH GROSS: CPT

## 2020-09-24 PROCEDURE — C1889: CPT

## 2020-09-24 PROCEDURE — 86769 SARS-COV-2 COVID-19 ANTIBODY: CPT

## 2020-09-24 PROCEDURE — 36415 COLL VENOUS BLD VENIPUNCTURE: CPT

## 2020-09-24 PROCEDURE — 80053 COMPREHEN METABOLIC PANEL: CPT

## 2020-09-24 PROCEDURE — 76000 FLUOROSCOPY <1 HR PHYS/QHP: CPT

## 2020-09-24 RX ORDER — SODIUM CHLORIDE 9 MG/ML
1000 INJECTION INTRAMUSCULAR; INTRAVENOUS; SUBCUTANEOUS ONCE
Refills: 0 | Status: COMPLETED | OUTPATIENT
Start: 2020-09-24 | End: 2020-09-24

## 2020-09-24 RX ORDER — ENOXAPARIN SODIUM 100 MG/ML
40 INJECTION SUBCUTANEOUS DAILY
Refills: 0 | Status: DISCONTINUED | OUTPATIENT
Start: 2020-09-24 | End: 2020-09-30

## 2020-09-24 RX ORDER — CEFTRIAXONE 500 MG/1
1000 INJECTION, POWDER, FOR SOLUTION INTRAMUSCULAR; INTRAVENOUS EVERY 24 HOURS
Refills: 0 | Status: DISCONTINUED | OUTPATIENT
Start: 2020-09-24 | End: 2020-09-25

## 2020-09-24 RX ORDER — CEFTRIAXONE 500 MG/1
1000 INJECTION, POWDER, FOR SOLUTION INTRAMUSCULAR; INTRAVENOUS EVERY 24 HOURS
Refills: 0 | Status: DISCONTINUED | OUTPATIENT
Start: 2020-09-24 | End: 2020-09-24

## 2020-09-24 RX ORDER — SODIUM CHLORIDE 9 MG/ML
1000 INJECTION INTRAMUSCULAR; INTRAVENOUS; SUBCUTANEOUS
Refills: 0 | Status: DISCONTINUED | OUTPATIENT
Start: 2020-09-24 | End: 2020-09-24

## 2020-09-24 RX ORDER — LEVOTHYROXINE SODIUM 125 MCG
100 TABLET ORAL DAILY
Refills: 0 | Status: DISCONTINUED | OUTPATIENT
Start: 2020-09-24 | End: 2020-09-30

## 2020-09-24 RX ORDER — ACETAMINOPHEN 500 MG
650 TABLET ORAL EVERY 6 HOURS
Refills: 0 | Status: DISCONTINUED | OUTPATIENT
Start: 2020-09-24 | End: 2020-09-30

## 2020-09-24 RX ORDER — CARBIDOPA AND LEVODOPA 25; 100 MG/1; MG/1
1 TABLET ORAL ONCE
Refills: 0 | Status: DISCONTINUED | OUTPATIENT
Start: 2020-09-24 | End: 2020-09-24

## 2020-09-24 RX ORDER — SODIUM CHLORIDE 9 MG/ML
1000 INJECTION INTRAMUSCULAR; INTRAVENOUS; SUBCUTANEOUS
Refills: 0 | Status: DISCONTINUED | OUTPATIENT
Start: 2020-09-24 | End: 2020-09-26

## 2020-09-24 RX ORDER — ONDANSETRON 8 MG/1
4 TABLET, FILM COATED ORAL EVERY 6 HOURS
Refills: 0 | Status: DISCONTINUED | OUTPATIENT
Start: 2020-09-24 | End: 2020-09-30

## 2020-09-24 RX ADMIN — PIPERACILLIN AND TAZOBACTAM 3.38 GRAM(S): 4; .5 INJECTION, POWDER, LYOPHILIZED, FOR SOLUTION INTRAVENOUS at 00:33

## 2020-09-24 RX ADMIN — Medication 166.67 MILLIGRAM(S): at 00:36

## 2020-09-24 RX ADMIN — CEFTRIAXONE 1000 MILLIGRAM(S): 500 INJECTION, POWDER, FOR SOLUTION INTRAMUSCULAR; INTRAVENOUS at 08:34

## 2020-09-24 RX ADMIN — SODIUM CHLORIDE 1000 MILLILITER(S): 9 INJECTION INTRAMUSCULAR; INTRAVENOUS; SUBCUTANEOUS at 08:26

## 2020-09-24 RX ADMIN — SODIUM CHLORIDE 150 MILLILITER(S): 9 INJECTION INTRAMUSCULAR; INTRAVENOUS; SUBCUTANEOUS at 10:58

## 2020-09-24 RX ADMIN — SODIUM CHLORIDE 1000 MILLILITER(S): 9 INJECTION INTRAMUSCULAR; INTRAVENOUS; SUBCUTANEOUS at 03:08

## 2020-09-24 RX ADMIN — Medication 650 MILLIGRAM(S): at 07:23

## 2020-09-24 RX ADMIN — SODIUM CHLORIDE 2500 MILLILITER(S): 9 INJECTION INTRAMUSCULAR; INTRAVENOUS; SUBCUTANEOUS at 00:33

## 2020-09-24 RX ADMIN — Medication 1250 MILLIGRAM(S): at 01:56

## 2020-09-24 NOTE — PROVIDER CONTACT NOTE (OTHER) - SITUATION
notified Dr Day's office of admission
Left message with service to return Dr. Aguirre's call.
Left second message to please return Dr. Miller's call.

## 2020-09-24 NOTE — H&P ADULT - HISTORY OF PRESENT ILLNESS
58 y/o F with h/o hypothyroidism, kidney stone and was admitted from 8/10-8/12 for urosepsis with right ureteral stent by  Dr. Flex De La Cruz and d/c her on Cipro p/w recurrent symptoms of weakness/fatigue, n/v/ and abdominal pain progressively worsening over the last 24 hours.  She also complaining of fever and chills. In ED she was found to have temp of 101F. She received IV vancomycin  and IV zosyn in ED. Her blood pressure this early morning was in 80s, responded well with IV fluid bolus. Patient already started feeling better. No chest pain, diarrhea, sob.

## 2020-09-24 NOTE — H&P ADULT - NSHPLABSRESULTS_GEN_ALL_CORE
13.7   14.31 )-----------( 301      ( 23 Sep 2020 22:53 )             41.2     23 Sep 2020 22:53    135    |  102    |  15     ----------------------------<  135    3.8     |  24     |  1.06     Ca    9.7        23 Sep 2020 22:53    TPro  8.4    /  Alb  3.6    /  TBili  0.9    /  DBili  x      /  AST  32     /  ALT  28     /  AlkPhos  108    23 Sep 2020 22:53    LIVER FUNCTIONS - ( 23 Sep 2020 22:53 )  Alb: 3.6 g/dL / Pro: 8.4 gm/dL / ALK PHOS: 108 U/L / ALT: 28 U/L / AST: 32 U/L / GGT: x           PT/INR - ( 23 Sep 2020 22:53 )   PT: 13.9 sec;   INR: 1.21 ratio         PTT - ( 23 Sep 2020 22:53 )  PTT:27.8 sec  CAPILLARY BLOOD GLUCOSE            Urinalysis Basic - ( 23 Sep 2020 22:53 )    Color: Yellow / Appearance: Clear / S.015 / pH: x  Gluc: x / Ketone: Trace  / Bili: Negative / Urobili: 1 mg/dL   Blood: x / Protein: 30 mg/dL / Nitrite: Negative   Leuk Esterase: Moderate / RBC: 11-25 /HPF / WBC 11-25   Sq Epi: x / Non Sq Epi: Many / Bacteria: Many

## 2020-09-24 NOTE — CONSULT NOTE ADULT - ASSESSMENT
60 yo F with Right ureteral stent presents with UTI/ sepsis  - Ureteral stent location same compared to CT scan 8/18; no active intervention at this time  - c/w antibiotic for UTI. Send UCx and blood Cx  - Pain control   - pt has scheduled appt for ureteroscopy / lithotripsy on 9/30 58 yo F with Right ureteral stent presents with UTI/ sepsis  - Ureteral stent location same compared to CT scan 8/18, no obstruction; no active intervention at this time  - c/w antibiotic for UTI. Send UCx and blood Cx  - Pain control   - pt has scheduled appt for ureteroscopy / lithotripsy on 9/30

## 2020-09-24 NOTE — CONSULT NOTE ADULT - SUBJECTIVE AND OBJECTIVE BOX
HPI: 60 y/o F hx of R ureteral stent placement 8/10 for urosepsis presents recurrent symptoms of weakness/fatigue, n/v/ and abdominal pain progressively worsening over the last 24 hours.  Pt is scheduled to have ureteroscopy/ lithotripsy . She was sent over to ED after PCP for leukocytosis.  Pt notes NBNB emesis over the last 24 hours.  Pt denies cough, CP or SOB.  She feels suprapubic pressure without flank pain currently.   ED: fever 100.9; SBP reported high 90s- low 100s. HR in 110s. s/p 2 L IV fluids.   CT scan: mild hydro with stent in same location compared to CT scan .       PAST MEDICAL & SURGICAL HISTORY:  Prolapse of female pelvic organs    Urinary incontinence in female    Hemorrhoids    Barretts syndrome  Pre    GERD (gastroesophageal reflux disease)    Calcium ureterolithiasis    Cystocele with prolapse    Hypothyroidism    Uterine prolapse    History of endoscopy  upper endoscopy    History of colonoscopy  2017    Status post placement of ureteral stent  8/10/2020    Vaginal pessary in situ          REVIEW OF SYSTEMS:  in HPI      MEDICATIONS  (STANDING):  sodium chloride 0.9% Bolus 1000 milliLiter(s) IV Bolus once  SOCIAL HISTORY: No illicit drug use // smoking  : [ ] yes [ ] no //  : [ ] yes [ ] no // ETOH :  [ ] yes [ ] no    FAMILY HISTORY:  FH: coronary artery disease (Father, Mother)  mother, father    FH: diabetes mellitus  maternal uncle, brother        Vital Signs Last 24 Hrs  T(C): 37.7 (23 Sep 2020 23:46), Max: 38.3 (23 Sep 2020 22:23)  T(F): 99.8 (23 Sep 2020 23:46), Max: 100.9 (23 Sep 2020 22:23)  HR: 79 (24 Sep 2020 01:30) (79 - 130)  BP: 99/63 (24 Sep 2020 01:30) (99/63 - 143/94)  BP(mean): --  RR: 18 (24 Sep 2020 01:30) (16 - 28)  SpO2: 96% (24 Sep 2020 01:30) (90% - 100%)   [ ] yes [ ] no    PHYSICAL EXAM:  based on ED exam    I&O's Summary      LABS:                        13.7   14.31 )-----------( 301      ( 23 Sep 2020 22:53 )             41.2         135  |  102  |  15  ----------------------------<  135<H>  3.8   |  24  |  1.06    Ca    9.7      23 Sep 2020 22:53    TPro  8.4<H>  /  Alb  3.6  /  TBili  0.9  /  DBili  x   /  AST  32  /  ALT  28  /  AlkPhos  108      PT/INR - ( 23 Sep 2020 22:53 )   PT: 13.9 sec;   INR: 1.21 ratio         PTT - ( 23 Sep 2020 22:53 )  PTT:27.8 sec  Urinalysis Basic - ( 23 Sep 2020 22:53 )    Color: Yellow / Appearance: Clear / S.015 / pH: x  Gluc: x / Ketone: Trace  / Bili: Negative / Urobili: 1 mg/dL   Blood: x / Protein: 30 mg/dL / Nitrite: Negative   Leuk Esterase: Moderate / RBC: 11-25 /HPF / WBC 11-25   Sq Epi: x / Non Sq Epi: Many / Bacteria: Many HPI: 60 y/o F hx of R ureteral stent placement 8/10 for urosepsis presents recurrent symptoms of weakness/fatigue, n/v/ and abdominal pain progressively worsening over the last 24 hours.  Pt is scheduled to have ureteroscopy/ lithotripsy . She was sent over to ED after PCP for leukocytosis.  Pt notes NBNB emesis over the last 24 hours.  Pt denies cough, CP or SOB.  She feels suprapubic pressure without flank pain currently.   ED: fever 100.9; SBP in 110s. HR in 100s  CT scan: mild hydro with stent in same location compared to CT scan .       PAST MEDICAL & SURGICAL HISTORY:  Prolapse of female pelvic organs    Urinary incontinence in female    Hemorrhoids    Barretts syndrome  Pre    GERD (gastroesophageal reflux disease)    Calcium ureterolithiasis    Cystocele with prolapse    Hypothyroidism    Uterine prolapse    History of endoscopy  upper endoscopy    History of colonoscopy  2017    Status post placement of ureteral stent  8/10/2020    Vaginal pessary in situ          REVIEW OF SYSTEMS:  in HPI      MEDICATIONS  (STANDING):  sodium chloride 0.9% Bolus 1000 milliLiter(s) IV Bolus once  SOCIAL HISTORY: No illicit drug use // smoking  : [ ] yes [ ] no //  : [ ] yes [ ] no // ETOH :  [ ] yes [ ] no    FAMILY HISTORY:  FH: coronary artery disease (Father, Mother)  mother, father    FH: diabetes mellitus  maternal uncle, brother        Vital Signs Last 24 Hrs  T(C): 37.7 (23 Sep 2020 23:46), Max: 38.3 (23 Sep 2020 22:23)  T(F): 99.8 (23 Sep 2020 23:46), Max: 100.9 (23 Sep 2020 22:23)  HR: 79 (24 Sep 2020 01:30) (79 - 130)  BP: 99/63 (24 Sep 2020 01:30) (99/63 - 143/94)  BP(mean): --  RR: 18 (24 Sep 2020 01:30) (16 - 28)  SpO2: 96% (24 Sep 2020 01:30) (90% - 100%)   [ ] yes [ ] no    PHYSICAL EXAM:  general: NAD  lungs: no accessory muscle use  heart: tachycardic  abdominal: soft, mild suprapubic tenderness. No distention.   back : no CVA tenderness  extremities: no edema  neuro: AA)Ox3      I&O's Summary      LABS:                        13.7   14.31 )-----------( 301      ( 23 Sep 2020 22:53 )             41.2         135  |  102  |  15  ----------------------------<  135<H>  3.8   |  24  |  1.06    Ca    9.7      23 Sep 2020 22:53    TPro  8.4<H>  /  Alb  3.6  /  TBili  0.9  /  DBili  x   /  AST  32  /  ALT  28  /  AlkPhos  108  09-23    PT/INR - ( 23 Sep 2020 22:53 )   PT: 13.9 sec;   INR: 1.21 ratio         PTT - ( 23 Sep 2020 22:53 )  PTT:27.8 sec  Urinalysis Basic - ( 23 Sep 2020 22:53 )    Color: Yellow / Appearance: Clear / S.015 / pH: x  Gluc: x / Ketone: Trace  / Bili: Negative / Urobili: 1 mg/dL   Blood: x / Protein: 30 mg/dL / Nitrite: Negative   Leuk Esterase: Moderate / RBC: 11-25 /HPF / WBC 11-25   Sq Epi: x / Non Sq Epi: Many / Bacteria: Many

## 2020-09-24 NOTE — ED ADULT NURSE REASSESSMENT NOTE - NS ED NURSE REASSESS COMMENT FT1
Received pt from previous nurse. Pt is A&Ox4. Pt was hypotensive, tachycardic, and had a fever. Pt also was shaking. Updated Dr. Pollard. MD stated that he will place orders, will follow orders as prescribed. Safety and comfort measures in place. Hourly rounding will be done on my time. Will continue to monitor.

## 2020-09-24 NOTE — H&P ADULT - NSHPPHYSICALEXAM_GEN_ALL_CORE
Vital Signs Last 24 Hrs  T(C): 36.6 (24 Sep 2020 09:27), Max: 38.3 (23 Sep 2020 22:23)  T(F): 97.9 (24 Sep 2020 09:27), Max: 100.9 (23 Sep 2020 22:23)  HR: 99 (24 Sep 2020 09:27) (71 - 130)  BP: 100/56 (24 Sep 2020 09:27) (82/60 - 143/94)  BP(mean): 66 (24 Sep 2020 07:05) (66 - 66)  RR: 19 (24 Sep 2020 09:27) (16 - 28)  SpO2: 93% (24 Sep 2020 09:27) (90% - 100%)        · Constitutional  Well-developed, well nourished  · Eyes  EOMI; PERRL; no drainage or redness  · ENMT  No oral lesions; no gross abnormalities  · Neck  No bruits; no thyromegaly or nodules   · Breasts  No masses; no nipple discharge  · Back  No deformity or limitation of movement   · Respiratory  Breath Sounds equal & clear to percussion & auscultation, no accessory muscle use  · Cardiovascular  detailed exam  · Cardiovascular Details  tachycardia  · Gastrointestinal  detailed exam  · GI Normal  · Extremities  No cyanosis, clubbing or edema   · Vascular  Equal and normal pulses (carotid, femoral, dorsalis pedis)   · Neurological  Alert & oriented; no sensory, motor or coordination deficits, normal reflexes  · Skin  No lesions; no rash

## 2020-09-24 NOTE — H&P ADULT - ASSESSMENT
58 y/o F with h/o hypothyroidism, kidney stone and was admitted from 8/10-8/12 for urosepsis with right ureteral stent by  Dr. Flex De La Cruz and d/c her on Cipro p/w recurrent symptoms of weakness/fatigue, n/v/ and abdominal pain progressively worsening over the last 24 hours.  She also complaining of fever and chills. In ED she was found to have temp of 101F. She received IV vancomycin  and IV zosyn in ED. Her blood pressure this early morning was in 80s, responded well with IV fluid bolus. Patient already started feeling better. No chest pain, diarrhea, sob.       1. Sepsis-due to UTI  Admit to floor  Follow blood cultures and urine cultures  Start IV rocephin  ID eval   eval appreciated  Continue IV fluid  Normal lactate 1.4 in ED    2. Hypothyroidism  Continue synthroid    3. S/P right ureteric stent  last month   follow up    4. DVT prophylaxis.

## 2020-09-25 LAB
ANION GAP SERPL CALC-SCNC: 5 MMOL/L — SIGNIFICANT CHANGE UP (ref 5–17)
BUN SERPL-MCNC: 7 MG/DL — SIGNIFICANT CHANGE UP (ref 7–23)
CALCIUM SERPL-MCNC: 8.4 MG/DL — LOW (ref 8.5–10.1)
CHLORIDE SERPL-SCNC: 111 MMOL/L — HIGH (ref 96–108)
CO2 SERPL-SCNC: 25 MMOL/L — SIGNIFICANT CHANGE UP (ref 22–31)
CREAT SERPL-MCNC: 0.56 MG/DL — SIGNIFICANT CHANGE UP (ref 0.5–1.3)
GLUCOSE SERPL-MCNC: 86 MG/DL — SIGNIFICANT CHANGE UP (ref 70–99)
HCT VFR BLD CALC: 32.7 % — LOW (ref 34.5–45)
HGB BLD-MCNC: 10.4 G/DL — LOW (ref 11.5–15.5)
MCHC RBC-ENTMCNC: 28.7 PG — SIGNIFICANT CHANGE UP (ref 27–34)
MCHC RBC-ENTMCNC: 31.8 GM/DL — LOW (ref 32–36)
MCV RBC AUTO: 90.3 FL — SIGNIFICANT CHANGE UP (ref 80–100)
PLATELET # BLD AUTO: 210 K/UL — SIGNIFICANT CHANGE UP (ref 150–400)
POTASSIUM SERPL-MCNC: 3.9 MMOL/L — SIGNIFICANT CHANGE UP (ref 3.5–5.3)
POTASSIUM SERPL-SCNC: 3.9 MMOL/L — SIGNIFICANT CHANGE UP (ref 3.5–5.3)
RBC # BLD: 3.62 M/UL — LOW (ref 3.8–5.2)
RBC # FLD: 14 % — SIGNIFICANT CHANGE UP (ref 10.3–14.5)
SODIUM SERPL-SCNC: 141 MMOL/L — SIGNIFICANT CHANGE UP (ref 135–145)
WBC # BLD: 6.06 K/UL — SIGNIFICANT CHANGE UP (ref 3.8–10.5)
WBC # FLD AUTO: 6.06 K/UL — SIGNIFICANT CHANGE UP (ref 3.8–10.5)

## 2020-09-25 PROCEDURE — 99232 SBSQ HOSP IP/OBS MODERATE 35: CPT

## 2020-09-25 RX ORDER — AMPICILLIN SODIUM AND SULBACTAM SODIUM 250; 125 MG/ML; MG/ML
3 INJECTION, POWDER, FOR SUSPENSION INTRAMUSCULAR; INTRAVENOUS EVERY 6 HOURS
Refills: 0 | Status: DISCONTINUED | OUTPATIENT
Start: 2020-09-25 | End: 2020-09-30

## 2020-09-25 RX ADMIN — ENOXAPARIN SODIUM 40 MILLIGRAM(S): 100 INJECTION SUBCUTANEOUS at 11:01

## 2020-09-25 RX ADMIN — AMPICILLIN SODIUM AND SULBACTAM SODIUM 200 GRAM(S): 250; 125 INJECTION, POWDER, FOR SUSPENSION INTRAMUSCULAR; INTRAVENOUS at 14:36

## 2020-09-25 RX ADMIN — AMPICILLIN SODIUM AND SULBACTAM SODIUM 200 GRAM(S): 250; 125 INJECTION, POWDER, FOR SUSPENSION INTRAMUSCULAR; INTRAVENOUS at 23:02

## 2020-09-25 RX ADMIN — AMPICILLIN SODIUM AND SULBACTAM SODIUM 200 GRAM(S): 250; 125 INJECTION, POWDER, FOR SUSPENSION INTRAMUSCULAR; INTRAVENOUS at 18:22

## 2020-09-25 RX ADMIN — SODIUM CHLORIDE 150 MILLILITER(S): 9 INJECTION INTRAMUSCULAR; INTRAVENOUS; SUBCUTANEOUS at 23:02

## 2020-09-25 RX ADMIN — CEFTRIAXONE 1000 MILLIGRAM(S): 500 INJECTION, POWDER, FOR SOLUTION INTRAMUSCULAR; INTRAVENOUS at 11:01

## 2020-09-25 RX ADMIN — SODIUM CHLORIDE 150 MILLILITER(S): 9 INJECTION INTRAMUSCULAR; INTRAVENOUS; SUBCUTANEOUS at 05:42

## 2020-09-25 RX ADMIN — SODIUM CHLORIDE 150 MILLILITER(S): 9 INJECTION INTRAMUSCULAR; INTRAVENOUS; SUBCUTANEOUS at 18:20

## 2020-09-25 RX ADMIN — Medication 100 MICROGRAM(S): at 05:42

## 2020-09-25 NOTE — CONSULT NOTE ADULT - ASSESSMENT
58 y/o Female with h/o hypothyroidism, kidney stones, recent hospitalization for urosepsis (8/10-8/12) s/p right ureteral stent by  Dr. Flex De La Cruz and treated with Cipro was admitted on 9/24 for recurrent symptoms of weakness/fatigue, n/v/ and abdominal pain progressively worsening over the last 24 hours.  She also had fever and chills at home. In ED she was found to have temp of 101F and received IV vancomycin  and IV zosyn, then ceftriaxone. Her blood pressure was in 80s, responded with IV fluid bolus.     1. Febrile syndrome. Possible sepsis. UTI with ENFA. Kidney stones. Right hydronephrosis with nephroureteral stent in place.   -concerned about persistent infection  -obtain BC x 2, urine c/s  -start unasyn 3. gm IV q6h  -reason for abx use and side effects reviewed with patient; monitor BMP   -old chart reviewed to assess prior cultures  -monitor temps  -f/u CBC  -supportive care  2. Other issues:   -care per medicine   60 y/o Female with h/o hypothyroidism, kidney stones, recent hospitalization for urosepsis (8/10-8/12) s/p right ureteral stent by  Dr. Flex De La Cruz and treated with Cipro was admitted on 9/24 for recurrent symptoms of weakness/fatigue, n/v/ and abdominal pain progressively worsening over the last 24 hours.  She also had fever and chills at home. In ED she was found to have temp of 101F and received IV vancomycin  and IV zosyn, then ceftriaxone. Her blood pressure was in 80s, responded with IV fluid bolus.     1. Febrile syndrome. Possible sepsis. UTI with ENFA. Kidney stones. Right hydronephrosis with nephroureteral stent in place.   -leukocytosis  -recurrent infection  -obtain BC x 2, urine c/s  -start unasyn 3 gm IV q6h  -reason for abx use and side effects reviewed with patient; monitor BMP   -old chart reviewed to assess prior cultures  -urology evaluation for stent removal and stone care  -monitor temps  -f/u CBC  -supportive care  2. Other issues:   -care per medicine

## 2020-09-25 NOTE — CONSULT NOTE ADULT - SUBJECTIVE AND OBJECTIVE BOX
Patient is a 59y old  Female who presents with a chief complaint of Fever, chills     HPI:  60 y/o Female with h/o hypothyroidism, kidney stones, recent hospitalization for urosespsi (8/10-) s/p right ureteral stent by  Dr. Flex De La Cruz and treated with Cipro was admitted on  for recurrent symptoms of weakness/fatigue, n/v/ and abdominal pain progressively worsening over the last 24 hours.  She also had fever and chills at home. In ED she was found to have temp of 101F and received IV vancomycin  and IV zosyn, then ceftriaxone. Her blood pressure was in 80s, responded with IV fluid bolus.         PMH: as above  PSH: as above  Meds: per reconciliation sheet, noted below  MEDICATIONS  (STANDING):  ampicillin/sulbactam  IVPB 3 Gram(s) IV Intermittent every 6 hours  enoxaparin Injectable 40 milliGRAM(s) SubCutaneous daily  levothyroxine 100 MICROGram(s) Oral daily  sodium chloride 0.9%. 1000 milliLiter(s) (150 mL/Hr) IV Continuous <Continuous>    MEDICATIONS  (PRN):  acetaminophen   Tablet .. 650 milliGRAM(s) Oral every 6 hours PRN Mild Pain (1 - 3)  aluminum hydroxide/magnesium hydroxide/simethicone Suspension 30 milliLiter(s) Oral every 4 hours PRN Dyspepsia  ondansetron Injectable 4 milliGRAM(s) IV Push every 6 hours PRN Nausea and/or Vomiting    Allergies    No Known Allergies    Intolerances      Social: no smoking, no alcohol, no illegal drugs; no recent travel, no exposure to TB  FAMILY HISTORY:  FH: coronary artery disease (Father, Mother)  mother, father    FH: diabetes mellitus  maternal uncle, brother      no history of premature cardiovascular disease in first degree relatives    ROS: the patient denies fever, no chills, no HA, no seizures, no dizziness, no sore throat, no nasal congestion, no blurry vision, no CP, no palpitations, no SOB, no cough, no abdominal pain, no diarrhea, no N/V, has dysuria, no leg pain, no claudication, no rash, no joint aches, no rectal pain or bleeding, no night sweats  All other systems reviewed and are negative    Vital Signs Last 24 Hrs  T(C): 36.8 (25 Sep 2020 08:56), Max: 37.1 (24 Sep 2020 14:59)  T(F): 98.2 (25 Sep 2020 08:56), Max: 98.7 (24 Sep 2020 14:59)  HR: 87 (25 Sep 2020 08:56) (87 - 108)  BP: 106/68 (25 Sep 2020 08:56) (106/68 - 131/82)  BP(mean): --  RR: 17 (25 Sep 2020 08:56) (17 - 20)  SpO2: 95% (25 Sep 2020 08:56) (95% - 96%)  Daily     Daily     PE:    Constitutional:  No acute distress  HEENT: NC/AT, EOMI, PERRLA, conjunctivae clear; ears and nose atraumatic; pharynx benign  Neck: supple; thyroid not palpable  Back: no tenderness  Respiratory: respiratory effort normal; clear to auscultation  Cardiovascular: S1S2 regular, no murmurs  Abdomen: soft, not tender, not distended, positive BS; no liver or spleen organomegaly  Genitourinary: has suprapubic tenderness  Lymphatic: no LN palpable  Musculoskeletal: no muscle tenderness, no joint swelling or tenderness  Extremities: no pedal edema  Neurological/ Psychiatric: AxOx3, judgement and insight normal; moving all extremities  Skin: no rashes; no palpable lesions    Labs: all available labs reviewed                        10.4   6.06  )-----------( 210      ( 25 Sep 2020 08:52 )             32.7     09-25    141  |  111<H>  |  7   ----------------------------<  86  3.9   |  25  |  0.56    Ca    8.4<L>      25 Sep 2020 08:52    TPro  8.4<H>  /  Alb  3.6  /  TBili  0.9  /  DBili  x   /  AST  32  /  ALT  28  /  AlkPhos  108  09-23     LIVER FUNCTIONS - ( 23 Sep 2020 22:53 )  Alb: 3.6 g/dL / Pro: 8.4 gm/dL / ALK PHOS: 108 U/L / ALT: 28 U/L / AST: 32 U/L / GGT: x           Urinalysis Basic - ( 23 Sep 2020 22:53 )    Color: Yellow / Appearance: Clear / S.015 / pH: x  Gluc: x / Ketone: Trace  / Bili: Negative / Urobili: 1 mg/dL   Blood: x / Protein: 30 mg/dL / Nitrite: Negative   Leuk Esterase: Moderate / RBC: 11-25 /HPF / WBC 11-25   Sq Epi: x / Non Sq Epi: Many / Bacteria: Many      Culture - Urine (collected 23 Sep 2020 22:53)  Source: .Urine Clean Catch (Midstream)  Preliminary Report (25 Sep 2020 04:33):    >100,000 CFU/ml Enterococcus species    Culture - Blood (collected 23 Sep 2020 22:53)  Source: .Blood Blood-Peripheral  Preliminary Report (25 Sep 2020 06:01):    No growth to date.    Culture - Blood (collected 23 Sep 2020 22:53)  Source: .Blood Blood-Peripheral  Preliminary Report (25 Sep 2020 06:01):    No growth to date.    Culture - Urine (collected 22 Sep 2020 08:46)  Source: .Urine None  Final Report (24 Sep 2020 19:29):    >100,000 CFU/ml Enterococcus faecalis  Organism: Enterococcus faecalis (24 Sep 2020 19:29)  Organism: Enterococcus faecalis (24 Sep 2020 19:29)      -  Ampicillin: S <=2 Predicts results to ampicillin/sulbactam, amoxacillin-clavulanate and  piperacillin-tazobactam.      -  Ciprofloxacin: S <=1      -  Levofloxacin: S <=1      -  Nitrofurantoin: S <=32 Should not be used to treat pyelonephritis.      -  Tetra/Doxy: R >8      -  Vancomycin: S 2      Method Type: NETTE      COVID-19 PCR: NotDetec (20 @ 04:44)    Radiology: all available radiological tests reviewed    < from: CT Abdomen and Pelvis No Cont (20 @ 23:27) >  KIDNEYS/URETERS: Mild right hydronephrosis and moderate dilatation of the right renal pelvis, slightly increased since the previous examination, with similar positioning of right nephroureteral stent, and slightly decreased dilatation of the right ureter. Stable 5 mm distal right ureteral calculus. Trace right perinephric fat stranding, slightly increased, with mild improvement in periureteral inflammatory fat stranding.    < end of copied text >      Advanced directives addressed: full resuscitation

## 2020-09-25 NOTE — PROGRESS NOTE ADULT - SUBJECTIVE AND OBJECTIVE BOX
58 y/o F with h/o hypothyroidism, kidney stone and was admitted from 8/10- for urosepsis with right ureteral stent by  Dr. Flex De La Cruz and d/c her on Cipro p/w recurrent symptoms of weakness/fatigue, n/v/ and abdominal pain progressively worsening over the last 24 hours.  She also complaining of fever and chills. In ED she was found to have temp of 101F. She received IV vancomycin  and IV zosyn in ED. Her blood pressure this early morning was in 80s, responded well with IV fluid bolus. Patient already started feeling better. No chest pain, diarrhea, sob.         20: Patient seen and examined. No new issues. Discussed with Dr RADHA De La Cruz. Discussed with patient in length regarding management plan.         Vital Signs Last 24 Hrs  T(C): 36.8 (25 Sep 2020 08:56), Max: 37.1 (24 Sep 2020 14:59)  T(F): 98.2 (25 Sep 2020 08:56), Max: 98.7 (24 Sep 2020 14:59)  HR: 87 (25 Sep 2020 08:56) (87 - 108)  BP: 106/68 (25 Sep 2020 08:56) (106/68 - 131/82)  BP(mean): --  RR: 17 (25 Sep 2020 08:56) (17 - 20)  SpO2: 95% (25 Sep 2020 08:56) (95% - 96%)      · Constitutional: Well-developed, well nourished  · Eyes: EOMI; PERRL; no drainage or redness  · ENMT: No oral lesions; no gross abnormalities  · Neck: No bruits; no thyromegaly or nodules   · Respiratory: Breath Sounds equal & clear to percussion & auscultation, no accessory muscle use  · Cardiovascular: S1, S2 regular, no murmur  · GI: Normal bowel sound, abdomen soft nontender  · Extremities: No cyanosis, clubbing or edema   · Vascular: Equal and normal pulses (carotid, femoral, dorsalis pedis)   · Neurological: Alert & oriented; no sensory, motor or coordination deficits, normal reflexes  · Skin: No lesions; no rash          MEDICATIONS  (STANDING):  ampicillin/sulbactam  IVPB 3 Gram(s) IV Intermittent every 6 hours  enoxaparin Injectable 40 milliGRAM(s) SubCutaneous daily  levothyroxine 100 MICROGram(s) Oral daily  sodium chloride 0.9%. 1000 milliLiter(s) (150 mL/Hr) IV Continuous <Continuous>    MEDICATIONS  (PRN):  acetaminophen   Tablet .. 650 milliGRAM(s) Oral every 6 hours PRN Mild Pain (1 - 3)  aluminum hydroxide/magnesium hydroxide/simethicone Suspension 30 milliLiter(s) Oral every 4 hours PRN Dyspepsia  ondansetron Injectable 4 milliGRAM(s) IV Push every 6 hours PRN Nausea and/or Vomiting                                10.4   6.06  )-----------( 210      ( 25 Sep 2020 08:52 )             32.7     25 Sep 2020 08:52    141    |  111    |  7      ----------------------------<  86     3.9     |  25     |  0.56     Ca    8.4        25 Sep 2020 08:52    TPro  8.4    /  Alb  3.6    /  TBili  0.9    /  DBili  x      /  AST  32     /  ALT  28     /  AlkPhos  108    23 Sep 2020 22:53    LIVER FUNCTIONS - ( 23 Sep 2020 22:53 )  Alb: 3.6 g/dL / Pro: 8.4 gm/dL / ALK PHOS: 108 U/L / ALT: 28 U/L / AST: 32 U/L / GGT: x           PT/INR - ( 23 Sep 2020 22:53 )   PT: 13.9 sec;   INR: 1.21 ratio         PTT - ( 23 Sep 2020 22:53 )  PTT:27.8 sec  CAPILLARY BLOOD GLUCOSE            Urinalysis Basic - ( 23 Sep 2020 22:53 )    Color: Yellow / Appearance: Clear / S.015 / pH: x  Gluc: x / Ketone: Trace  / Bili: Negative / Urobili: 1 mg/dL   Blood: x / Protein: 30 mg/dL / Nitrite: Negative   Leuk Esterase: Moderate / RBC: 11-25 /HPF / WBC 11-25   Sq Epi: x / Non Sq Epi: Many / Bacteria: Many          Assessment and Plan:   Assessment:  · Assessment	  58 y/o F with h/o hypothyroidism, kidney stone and was admitted from 8/10- for urosepsis with right ureteral stent by  Dr. Flex De La Cruz and d/c her on Cipro p/w recurrent symptoms of weakness/fatigue, n/v/ and abdominal pain progressively worsening over the last 24 hours.  She also complaining of fever and chills. In ED she was found to have temp of 101F. She received IV vancomycin  and IV zosyn in ED. Her blood pressure this early morning was in 80s, responded well with IV fluid bolus. Patient already started feeling better. No chest pain, diarrhea, sob.       1. Sepsis-due to Enterococcus  UTI  Follow blood cultures and urine cultures  Continue unasyn  ID eval appreciated   eval appreciated  Continue IV fluid for one more day    2. Hypothyroidism  Continue synthroid    3. S/P right ureteric stent  last month   follow up    4. DVT prophylaxis.

## 2020-09-26 PROCEDURE — 99232 SBSQ HOSP IP/OBS MODERATE 35: CPT

## 2020-09-26 RX ADMIN — AMPICILLIN SODIUM AND SULBACTAM SODIUM 200 GRAM(S): 250; 125 INJECTION, POWDER, FOR SUSPENSION INTRAMUSCULAR; INTRAVENOUS at 11:00

## 2020-09-26 RX ADMIN — AMPICILLIN SODIUM AND SULBACTAM SODIUM 200 GRAM(S): 250; 125 INJECTION, POWDER, FOR SUSPENSION INTRAMUSCULAR; INTRAVENOUS at 23:06

## 2020-09-26 RX ADMIN — Medication 100 MICROGRAM(S): at 06:28

## 2020-09-26 RX ADMIN — AMPICILLIN SODIUM AND SULBACTAM SODIUM 200 GRAM(S): 250; 125 INJECTION, POWDER, FOR SUSPENSION INTRAMUSCULAR; INTRAVENOUS at 06:28

## 2020-09-26 RX ADMIN — AMPICILLIN SODIUM AND SULBACTAM SODIUM 200 GRAM(S): 250; 125 INJECTION, POWDER, FOR SUSPENSION INTRAMUSCULAR; INTRAVENOUS at 17:03

## 2020-09-26 RX ADMIN — SODIUM CHLORIDE 150 MILLILITER(S): 9 INJECTION INTRAMUSCULAR; INTRAVENOUS; SUBCUTANEOUS at 06:28

## 2020-09-26 RX ADMIN — ENOXAPARIN SODIUM 40 MILLIGRAM(S): 100 INJECTION SUBCUTANEOUS at 11:00

## 2020-09-26 NOTE — PROGRESS NOTE ADULT - SUBJECTIVE AND OBJECTIVE BOX
60 y/o F with h/o hypothyroidism, kidney stone and was admitted from 8/10- for urosepsis with right ureteral stent by  Dr. Flex De La Cruz and d/c her on Cipro p/w recurrent symptoms of weakness/fatigue, n/v/ and abdominal pain progressively worsening over the last 24 hours.  She also complaining of fever and chills. In ED she was found to have temp of 101F. She received IV vancomycin  and IV zosyn in ED. Her blood pressure this early morning was in 80s, responded well with IV fluid bolus. Patient already started feeling better. No chest pain, diarrhea, sob.         20: Patient seen and examined. No new issues. Discussed with Dr RADHA De La Cruz. Discussed with patient in length regarding management plan.   20: Patient seen and examined. No new issues. Feels better today        Vital Signs Last 24 Hrs  T(C): 36.5 (26 Sep 2020 08:52), Max: 36.9 (25 Sep 2020 15:43)  T(F): 97.7 (26 Sep 2020 08:52), Max: 98.4 (25 Sep 2020 15:43)  HR: 100 (26 Sep 2020 08:52) (73 - 100)  BP: 106/68 (26 Sep 2020 08:52) (101/57 - 106/68)  BP(mean): --  RR: 16 (26 Sep 2020 08:52) (16 - 18)  SpO2: 96% (26 Sep 2020 08:52) (94% - 97%)      Physical Exam:     · Constitutional: Well-developed, well nourished  · Eyes: EOMI; PERRL; no drainage or redness  · ENMT: No oral lesions; no gross abnormalities  · Neck: No bruits; no thyromegaly or nodules   · Respiratory: Breath Sounds equal & clear to percussion & auscultation, no accessory muscle use  · Cardiovascular: S1, S2 regular, no murmur  · GI: Normal bowel sound, abdomen soft nontender  · Extremities: No cyanosis, clubbing or edema   · Vascular: Equal and normal pulses (carotid, femoral, dorsalis pedis)   · Neurological: Alert & oriented; no sensory, motor or coordination deficits, normal reflexes  · Skin: No lesions; no rash      MEDICATIONS  (STANDING):  ampicillin/sulbactam  IVPB 3 Gram(s) IV Intermittent every 6 hours  enoxaparin Injectable 40 milliGRAM(s) SubCutaneous daily  levothyroxine 100 MICROGram(s) Oral daily  sodium chloride 0.9%. 1000 milliLiter(s) (150 mL/Hr) IV Continuous <Continuous>    MEDICATIONS  (PRN):  acetaminophen   Tablet .. 650 milliGRAM(s) Oral every 6 hours PRN Mild Pain (1 - 3)  aluminum hydroxide/magnesium hydroxide/simethicone Suspension 30 milliLiter(s) Oral every 4 hours PRN Dyspepsia  ondansetron Injectable 4 milliGRAM(s) IV Push every 6 hours PRN Nausea and/or Vomiting                                10.4   6.06  )-----------( 210      ( 25 Sep 2020 08:52 )             32.7     25 Sep 2020 08:52    141    |  111    |  7      ----------------------------<  86     3.9     |  25     |  0.56     Ca    8.4        25 Sep 2020 08:52    TPro  8.4    /  Alb  3.6    /  TBili  0.9    /  DBili  x      /  AST  32     /  ALT  28     /  AlkPhos  108    23 Sep 2020 22:53    LIVER FUNCTIONS - ( 23 Sep 2020 22:53 )  Alb: 3.6 g/dL / Pro: 8.4 gm/dL / ALK PHOS: 108 U/L / ALT: 28 U/L / AST: 32 U/L / GGT: x           PT/INR - ( 23 Sep 2020 22:53 )   PT: 13.9 sec;   INR: 1.21 ratio         PTT - ( 23 Sep 2020 22:53 )  PTT:27.8 sec  CAPILLARY BLOOD GLUCOSE            Urinalysis Basic - ( 23 Sep 2020 22:53 )    Color: Yellow / Appearance: Clear / S.015 / pH: x  Gluc: x / Ketone: Trace  / Bili: Negative / Urobili: 1 mg/dL   Blood: x / Protein: 30 mg/dL / Nitrite: Negative   Leuk Esterase: Moderate / RBC: 11-25 /HPF / WBC 11-25   Sq Epi: x / Non Sq Epi: Many / Bacteria: Many          Assessment and Plan:   Assessment:  · Assessment	  60 y/o F with h/o hypothyroidism, kidney stone and was admitted from 8/10- for urosepsis with right ureteral stent by  Dr. Flex De La Cruz and d/c her on Cipro p/w recurrent symptoms of weakness/fatigue, n/v/ and abdominal pain progressively worsening over the last 24 hours.  She also complaining of fever and chills. In ED she was found to have temp of 101F. She received IV vancomycin  and IV zosyn in ED. Her blood pressure this early morning was in 80s, responded well with IV fluid bolus. Patient already started feeling better. No chest pain, diarrhea, sob.       1. Sepsis-due to Enterococcus  fecalis UTI  ContinueIV  unasyn  ID eval appreciated   eval appreciated    2. Hypothyroidism  Continue synthroid    3. S/P right ureteric stent  last month   follow up    4. DVT prophylaxis.

## 2020-09-27 PROCEDURE — 99232 SBSQ HOSP IP/OBS MODERATE 35: CPT

## 2020-09-27 RX ADMIN — AMPICILLIN SODIUM AND SULBACTAM SODIUM 200 GRAM(S): 250; 125 INJECTION, POWDER, FOR SUSPENSION INTRAMUSCULAR; INTRAVENOUS at 11:35

## 2020-09-27 RX ADMIN — Medication 100 MICROGRAM(S): at 05:15

## 2020-09-27 RX ADMIN — ENOXAPARIN SODIUM 40 MILLIGRAM(S): 100 INJECTION SUBCUTANEOUS at 10:17

## 2020-09-27 RX ADMIN — AMPICILLIN SODIUM AND SULBACTAM SODIUM 200 GRAM(S): 250; 125 INJECTION, POWDER, FOR SUSPENSION INTRAMUSCULAR; INTRAVENOUS at 05:15

## 2020-09-27 RX ADMIN — AMPICILLIN SODIUM AND SULBACTAM SODIUM 200 GRAM(S): 250; 125 INJECTION, POWDER, FOR SUSPENSION INTRAMUSCULAR; INTRAVENOUS at 23:13

## 2020-09-27 RX ADMIN — AMPICILLIN SODIUM AND SULBACTAM SODIUM 200 GRAM(S): 250; 125 INJECTION, POWDER, FOR SUSPENSION INTRAMUSCULAR; INTRAVENOUS at 17:19

## 2020-09-27 NOTE — PROGRESS NOTE ADULT - SUBJECTIVE AND OBJECTIVE BOX
60 y/o F with h/o hypothyroidism, kidney stone and was admitted from 8/10-8/12 for urosepsis with right ureteral stent by  Dr. Flex De La Cruz and d/c her on Cipro p/w recurrent symptoms of weakness/fatigue, n/v/ and abdominal pain progressively worsening over the last 24 hours.  She also complaining of fever and chills. In ED she was found to have temp of 101F. She received IV vancomycin  and IV zosyn in ED. Her blood pressure this early morning was in 80s, responded well with IV fluid bolus. Patient already started feeling better. No chest pain, diarrhea, sob.         09/25/20: Patient seen and examined. No new issues. Discussed with Dr RADHA De La Cruz. Discussed with patient in length regarding management plan.   09/26/20: Patient seen and examined. No new issues. Feels better today.  09/27/20: patient seen and examined  and discussed with her regarding management plan. Feels much better today        Vital Signs Last 24 Hrs  T(C): 36.4 (27 Sep 2020 08:22), Max: 36.7 (26 Sep 2020 16:17)  T(F): 97.6 (27 Sep 2020 08:22), Max: 98.1 (26 Sep 2020 16:17)  HR: 75 (27 Sep 2020 08:22) (75 - 90)  BP: 121/78 (27 Sep 2020 08:22) (120/61 - 124/80)  BP(mean): --  RR: 18 (27 Sep 2020 08:22) (18 - 18)  SpO2: 93% (27 Sep 2020 08:22) (93% - 97%)      Physical Exam:     · Constitutional: Well-developed, well nourished  · Eyes: EOMI; PERRL; no drainage or redness  · ENMT: No oral lesions; no gross abnormalities  · Neck: No bruits; no thyromegaly or nodules   · Respiratory: Breath Sounds equal & clear to percussion & auscultation, no accessory muscle use  · Cardiovascular: S1, S2 regular, no murmur  · GI: Normal bowel sound, abdomen soft nontender  · Extremities: No cyanosis, clubbing or edema   · Vascular: Equal and normal pulses (carotid, femoral, dorsalis pedis)   · Neurological: Alert & oriented; no sensory, motor or coordination deficits, normal reflexes  · Skin: No lesions; no rash      MEDICATIONS  (STANDING):  ampicillin/sulbactam  IVPB 3 Gram(s) IV Intermittent every 6 hours  enoxaparin Injectable 40 milliGRAM(s) SubCutaneous daily  levothyroxine 100 MICROGram(s) Oral daily  sodium chloride 0.9%. 1000 milliLiter(s) (150 mL/Hr) IV Continuous <Continuous>    MEDICATIONS  (PRN):  acetaminophen   Tablet .. 650 milliGRAM(s) Oral every 6 hours PRN Mild Pain (1 - 3)  aluminum hydroxide/magnesium hydroxide/simethicone Suspension 30 milliLiter(s) Oral every 4 hours PRN Dyspepsia  ondansetron Injectable 4 milliGRAM(s) IV Push every 6 hours PRN Nausea and/or Vomiting                Assessment and Plan:   Assessment:  · Assessment	  60 y/o F with h/o hypothyroidism, kidney stone and was admitted from 8/10-8/12 for urosepsis with right ureteral stent by  Dr. Flex De La Cruz and d/c her on Cipro p/w recurrent symptoms of weakness/fatigue, n/v/ and abdominal pain progressively worsening over the last 24 hours.  She also complaining of fever and chills. In ED she was found to have temp of 101F. She received IV vancomycin  and IV zosyn in ED. Her blood pressure this early morning was in 80s, responded well with IV fluid bolus. Patient already started feeling better. No chest pain, diarrhea, sob.       1. Sepsis-due to Enterococcus  fecalis UTI  Continue  IV  unasyn  ID eval appreciated   eval appreciated    2. Hypothyroidism  Continue synthroid    3. S/P right ureteric stent  last month   follow up    4. DVT prophylaxis.

## 2020-09-28 PROCEDURE — 99233 SBSQ HOSP IP/OBS HIGH 50: CPT

## 2020-09-28 RX ADMIN — ENOXAPARIN SODIUM 40 MILLIGRAM(S): 100 INJECTION SUBCUTANEOUS at 10:28

## 2020-09-28 RX ADMIN — AMPICILLIN SODIUM AND SULBACTAM SODIUM 200 GRAM(S): 250; 125 INJECTION, POWDER, FOR SUSPENSION INTRAMUSCULAR; INTRAVENOUS at 23:29

## 2020-09-28 RX ADMIN — Medication 100 MICROGRAM(S): at 05:19

## 2020-09-28 RX ADMIN — AMPICILLIN SODIUM AND SULBACTAM SODIUM 200 GRAM(S): 250; 125 INJECTION, POWDER, FOR SUSPENSION INTRAMUSCULAR; INTRAVENOUS at 12:36

## 2020-09-28 RX ADMIN — AMPICILLIN SODIUM AND SULBACTAM SODIUM 200 GRAM(S): 250; 125 INJECTION, POWDER, FOR SUSPENSION INTRAMUSCULAR; INTRAVENOUS at 05:19

## 2020-09-28 RX ADMIN — AMPICILLIN SODIUM AND SULBACTAM SODIUM 200 GRAM(S): 250; 125 INJECTION, POWDER, FOR SUSPENSION INTRAMUSCULAR; INTRAVENOUS at 17:29

## 2020-09-28 NOTE — [facility]I QUERY NOTE - [id number]_GEN_ALL_CORE_HH
Documentation on chart of Sepsis ands UTI.    Pt. has right ureteric stent.    Please document the relationship between the following conditions:  A) Sepsis present on admission due to UTI and associated to right ureteric stent  B) Sepsis present on admission due to UTI and NOT associated to right ureteric stent  C)  Unable to determine  D) Other ( Please specify condition)

## 2020-09-28 NOTE — PROGRESS NOTE ADULT - ASSESSMENT
58 y/o Female with h/o hypothyroidism, kidney stones, recent hospitalization for urosepsis (8/10-8/12) s/p right ureteral stent by  Dr. Flex De La Cruz and treated with Cipro was admitted on 9/24 for recurrent symptoms of weakness/fatigue, n/v/ and abdominal pain progressively worsening over the last 24 hours.  She also had fever and chills at home. In ED she was found to have temp of 101F and received IV vancomycin  and IV zosyn, then ceftriaxone. Her blood pressure was in 80s, responded with IV fluid bolus.     1. Febrile syndrome resolving. UTI with ENFA. Kidney stones. Right hydronephrosis with nephroureteral stent in place.   -leukocytosis  -recurrent infection  -BC x 2, urine c/s reviewed  -on unasyn 3 gm IV q6h # 3  -tolerating abx well so far; no side effects noted  -plan for lithotripsy and ureteral stent removal  -urology evaluation appreciated  -continue IV abx coverage  -monitor temps  -f/u CBC  -supportive care  2. Other issues:   -care per medicine

## 2020-09-28 NOTE — PROGRESS NOTE ADULT - SUBJECTIVE AND OBJECTIVE BOX
58 y/o F with h/o hypothyroidism, kidney stone and was admitted from 8/10-8/12 for urosepsis with right ureteral stent by  Dr. Flex De La Cruz and d/c her on Cipro p/w recurrent symptoms of weakness/fatigue, n/v/ and abdominal pain progressively worsening over the last 24 hours.  She also complaining of fever and chills. In ED she was found to have temp of 101F. She received IV vancomycin  and IV zosyn in ED. Her blood pressure this early morning was in 80s, responded well with IV fluid bolus. Patient already started feeling better. No chest pain, diarrhea, sob.       09/25/20: Patient seen and examined. No new issues. Discussed with Dr RADHA De La Cruz. Discussed with patient in length regarding management plan.   09/26/20: Patient seen and examined. No new issues. Feels better today.  09/27/20: patient seen and examined  and discussed with her regarding management plan. Feels much better today  09/28/20: Patient seen and examined. no pain or discomfort. feels well today discussed with patient management plan.     All 10 systems reviewed and found to be negative with the exception of what has been described above.    MEDICATIONS  (STANDING):  ampicillin/sulbactam  IVPB 3 Gram(s) IV Intermittent every 6 hours  enoxaparin Injectable 40 milliGRAM(s) SubCutaneous daily  levothyroxine 100 MICROGram(s) Oral daily    MEDICATIONS  (PRN):  acetaminophen   Tablet .. 650 milliGRAM(s) Oral every 6 hours PRN Mild Pain (1 - 3)  aluminum hydroxide/magnesium hydroxide/simethicone Suspension 30 milliLiter(s) Oral every 4 hours PRN Dyspepsia  ondansetron Injectable 4 milliGRAM(s) IV Push every 6 hours PRN Nausea and/or Vomiting    VITALS:  T(F): 98.1 (09-28-20 @ 08:27), Max: 98.5 (09-27-20 @ 23:42)  HR: 76 (09-28-20 @ 08:27) (76 - 92)  BP: 114/73 (09-28-20 @ 08:27) (98/64 - 119/72)  RR: 17 (09-28-20 @ 08:27) (17 - 18)  SpO2: 95% (09-28-20 @ 08:27) (93% - 98%) ---- room air   Wt(kg): --    I&O's Summary    27 Sep 2020 07:01  -  28 Sep 2020 07:00  --------------------------------------------------------  IN: 200 mL / OUT: 0 mL / NET: 200 mL      PHYSICAL EXAM:    HEENT:  pupils equal and reactive, EOMI, no oropharyngeal lesions, erythema, exudates, oral thrush  NECK:   supple, no carotid bruits, no palpable lymph nodes, no thyromegaly  CV:  +S1, +S2, regular, no murmurs or rubs  RESP:   lungs clear to auscultation bilaterally, no wheezing, rales, rhonchi, good air entry bilaterally  BREAST:  not examined  GI:  abdomen soft, non-tender, non-distended, normal BS, no bruits, no abdominal masses, no palpable masses  RECTAL:  not examined  :  no CV tenderness.   MSK:   normal muscle tone, no atrophy, no rigidity, no contractions  EXT:  no clubbing, no cyanosis, no edema, no calf pain, swelling or erythema  VASCULAR:  pulses equal and symmetric in the upper and lower extremities  NEURO:  AAOX3, no focal neurological deficits, follows all commands, able to move extremities spontaneously  SKIN:  no ulcers, lesions or rashes 60 y/o F with h/o hypothyroidism, kidney stone and was admitted from 8/10-8/12 for urosepsis with right ureteral stent by  Dr. Flex De La Cruz and d/c her on Cipro p/w recurrent symptoms of weakness/fatigue, n/v/ and abdominal pain progressively worsening over the last 24 hours.  She also complaining of fever and chills. In ED she was found to have temp of 101F. She received IV vancomycin  and IV zosyn in ED. Her blood pressure this early morning was in 80s, responded well with IV fluid bolus. Patient already started feeling better. No chest pain, diarrhea, sob.     09/25/20: Patient seen and examined. No new issues. Discussed with Dr RADHA De La Cruz. Discussed with patient in length regarding management plan.   09/26/20: Patient seen and examined. No new issues. Feels better today.  09/27/20: patient seen and examined  and discussed with her regarding management plan. Feels much better today  09/28/20: Patient seen and examined. no pain or discomfort. feels well today discussed with patient management plan.     All 10 systems reviewed and found to be negative with the exception of what has been described above.    MEDICATIONS  (STANDING):  ampicillin/sulbactam  IVPB 3 Gram(s) IV Intermittent every 6 hours  enoxaparin Injectable 40 milliGRAM(s) SubCutaneous daily  levothyroxine 100 MICROGram(s) Oral daily    MEDICATIONS  (PRN):  acetaminophen   Tablet .. 650 milliGRAM(s) Oral every 6 hours PRN Mild Pain (1 - 3)  aluminum hydroxide/magnesium hydroxide/simethicone Suspension 30 milliLiter(s) Oral every 4 hours PRN Dyspepsia  ondansetron Injectable 4 milliGRAM(s) IV Push every 6 hour    VITALS:  T(F): 98.1 (09-28-20 @ 08:27), Max: 98.5 (09-27-20 @ 23:42)  HR: 76 (09-28-20 @ 08:27) (76 - 92)  BP: 114/73 (09-28-20 @ 08:27) (98/64 - 119/72)  RR: 17 (09-28-20 @ 08:27) (17 - 18)  SpO2: 95% (09-28-20 @ 08:27) (93% - 98%)  Wt(kg): --      I&O's Summary    27 Sep 2020 07:01  -  28 Sep 2020 07:00  --------------------------------------------------------  IN: 200 mL / OUT: 0 mL / NET: 200 mL    PHYSICAL EXAM:    HEENT:  pupils equal and reactive, EOMI, no oropharyngeal lesions, erythema, exudates, oral thrush  NECK:   supple, no carotid bruits, no palpable lymph nodes, no thyromegaly  CV:  +S1, +S2, regular, no murmurs or rubs  RESP:   lungs clear to auscultation bilaterally, no wheezing, rales, rhonchi, good air entry bilaterally  GI:  abdomen soft, non-tender, non-distended, normal BS, no bruits, no abdominal masses, no palpable masses  :  no CV tenderness.   MSK:   normal muscle tone, no atrophy, no rigidity, no contractions  EXT:  no clubbing, no cyanosis, no edema, no calf pain, swelling or erythema  VASCULAR:  pulses equal and symmetric in the upper and lower extremities  NEURO:  AAOX3, no focal neurological deficits, follows all commands, able to move extremities spontaneously  SKIN:  no ulcers, lesions or rash    MEDICATIONS  (STANDING):  ampicillin/sulbactam  IVPB 3 Gram(s) IV Intermittent every 6 hours  enoxaparin Injectable 40 milliGRAM(s) SubCutaneous daily  levothyroxine 100 MICROGram(s) Oral daily    MEDICATIONS  (PRN):  acetaminophen   Tablet .. 650 milliGRAM(s) Oral every 6 hours PRN Mild Pain (1 - 3)  aluminum hydroxide/magnesium hydroxide/simethicone Suspension 30 milliLiter(s) Oral every 4 hours PRN Dyspepsia  ondansetron Injectable 4 milliGRAM(s) IV Push every 6 hours PRN Nausea and/or Vomiting    RADIOLOGY:     < from: CT Abdomen and Pelvis No Cont (09.23.20 @ 23:27) >  IMPRESSION:  Similar positioning of right double-J nephroureteral stent with stable 5 mm distal ureteral stone. Mild right hydronephrosis and moderate dilatation of the right renal pelvis, slightly progressed since prior study of 9/16/2020, with minimal right perinephric inflammatory stranding.        < end of copied text >

## 2020-09-28 NOTE — PROGRESS NOTE ADULT - ASSESSMENT
60 yo F with Right ureteral stent presents with UTI/ sepsis. Blood Cx neg x 2. Ucx >100,000 CFU/ml Enterococcus faecalis on 9/23/20- Pt is on Unasyn as per ID.Ureteral stent location same compared to CT scan 8/18, no obstruction; no active intervention at this time.  Discussed with Dr. De La Cruz: Pt will be on ABX for a week until her procedure which is scheduled on 9/30.   - c/w antibiotic for UTI/ ID following  - Pain control   - Can proceed with ureteroscopy / lithotripsy on 9/30 as scheduled as per Dr. De La Cruz  - NPO MN prior to procedure and R/O COVID prior to procedure    Case discussed with Dr. De La Cruz

## 2020-09-28 NOTE — PROGRESS NOTE ADULT - SUBJECTIVE AND OBJECTIVE BOX
Date of service: 20 @ 13:22    Fever is down  Has urinary frequency  Suprapubic tenderness    ROS: no fever or chills; denies dizziness, no HA, no SOB or cough, no abdominal pain, no diarrhea or constipation; no legs pain, no rashes    MEDICATIONS  (STANDING):  ampicillin/sulbactam  IVPB 3 Gram(s) IV Intermittent every 6 hours  enoxaparin Injectable 40 milliGRAM(s) SubCutaneous daily  levothyroxine 100 MICROGram(s) Oral daily    Vital Signs Last 24 Hrs  T(C): 36.7 (28 Sep 2020 08:27), Max: 36.9 (27 Sep 2020 23:42)  T(F): 98.1 (28 Sep 2020 08:27), Max: 98.5 (27 Sep 2020 23:42)  HR: 76 (28 Sep 2020 08:27) (76 - 92)  BP: 114/73 (28 Sep 2020 08:27) (98/64 - 119/72)  BP(mean): --  RR: 17 (28 Sep 2020 08:27) (17 - 18)  SpO2: 95% (28 Sep 2020 08:27) (93% - 98%)     Physical exam:    Constitutional:  No acute distress  HEENT: NC/AT, EOMI, PERRLA, conjunctivae clear  Neck: supple; thyroid not palpable  Back: no tenderness  Respiratory: respiratory effort normal; clear to auscultation  Cardiovascular: S1S2 regular, no murmurs  Abdomen: soft, not tender, not distended, positive BS  Genitourinary: has suprapubic tenderness  Lymphatic: no LN palpable  Musculoskeletal: no muscle tenderness, no joint swelling or tenderness  Extremities: no pedal edema  Neurological/ Psychiatric: AxOx3, moving all extremities  Skin: no rashes; no palpable lesions    Labs: reviewed                        10.4   6.06  )-----------( 210      ( 25 Sep 2020 08:52 )             32.7     09-    141  |  111<H>  |  7   ----------------------------<  86  3.9   |  25  |  0.56    Ca    8.4<L>      25 Sep 2020 08:52    TPro  8.4<H>  /  Alb  3.6  /  TBili  0.9  /  DBili  x   /  AST  32  /  ALT  28  /  AlkPhos  108  09-23     LIVER FUNCTIONS - ( 23 Sep 2020 22:53 )  Alb: 3.6 g/dL / Pro: 8.4 gm/dL / ALK PHOS: 108 U/L / ALT: 28 U/L / AST: 32 U/L / GGT: x           Urinalysis Basic - ( 23 Sep 2020 22:53 )    Color: Yellow / Appearance: Clear / S.015 / pH: x  Gluc: x / Ketone: Trace  / Bili: Negative / Urobili: 1 mg/dL   Blood: x / Protein: 30 mg/dL / Nitrite: Negative   Leuk Esterase: Moderate / RBC: 11-25 /HPF / WBC 11-25   Sq Epi: x / Non Sq Epi: Many / Bacteria: Many      Culture - Urine (collected 23 Sep 2020 22:53)  Source: .Urine Clean Catch (Midstream)  Preliminary Report (25 Sep 2020 04:33):    >100,000 CFU/ml Enterococcus species    Culture - Blood (collected 23 Sep 2020 22:53)  Source: .Blood Blood-Peripheral  Preliminary Report (25 Sep 2020 06:01):    No growth to date.    Culture - Blood (collected 23 Sep 2020 22:53)  Source: .Blood Blood-Peripheral  Preliminary Report (25 Sep 2020 06:01):    No growth to date.    Culture - Urine (collected 22 Sep 2020 08:46)  Source: .Urine None  Final Report (24 Sep 2020 19:29):    >100,000 CFU/ml Enterococcus faecalis  Organism: Enterococcus faecalis (24 Sep 2020 19:29)  Organism: Enterococcus faecalis (24 Sep 2020 19:29)      -  Ampicillin: S <=2 Predicts results to ampicillin/sulbactam, amoxacillin-clavulanate and  piperacillin-tazobactam.      -  Ciprofloxacin: S <=1      -  Levofloxacin: S <=1      -  Nitrofurantoin: S <=32 Should not be used to treat pyelonephritis.      -  Tetra/Doxy: R >8      -  Vancomycin: S 2      Method Type: NETTE      COVID-19 PCR: NotDetec (20 @ 04:44)    Radiology: all available radiological tests reviewed    < from: CT Abdomen and Pelvis No Cont (20 @ 23:27) >  KIDNEYS/URETERS: Mild right hydronephrosis and moderate dilatation of the right renal pelvis, slightly increased since the previous examination, with similar positioning of right nephroureteral stent, and slightly decreased dilatation of the right ureter. Stable 5 mm distal right ureteral calculus. Trace right perinephric fat stranding, slightly increased, with mild improvement in periureteral inflammatory fat stranding.    < end of copied text >      Advanced directives addressed: full resuscitation

## 2020-09-28 NOTE — PROGRESS NOTE ADULT - ASSESSMENT
58 y/o F with h/o hypothyroidism, kidney stone and was admitted from 8/10-8/12 for urosepsis with right ureteral stent by  Dr. Flex De La Cruz and d/c her on Cipro p/w recurrent symptoms of weakness/fatigue, n/v/ and abdominal pain progressively worsening over the last 24 hours.  She also complaining of fever and chills. In ED she was found to have temp of 101F. She received IV vancomycin  and IV zosyn in ED. Her blood pressure this early morning was in 80s, responded well with IV fluid bolus. Patient already started feeling better. No chest pain, diarrhea, sob.       1. Sepsis-due to Enterococcus  fecalis UTI  Continue  IV  unasyn day 4   ID eval appreciated   eval appreciated    2. Hypothyroidism  Continue synthroid    3. S/P right ureteric stent  last month   follow up    4. DVT prophylaxis. 60 y/o F with h/o hypothyroidism, kidney stone and was admitted from 8/10-8/12 for urosepsis with right ureteral stent by  Dr. Flex De La Cruz and d/c her on Cipro p/w recurrent symptoms of weakness/fatigue, n/v/ and abdominal pain progressively worsening over the last 24 hours.  She also complaining of fever and chills. In ED she was found to have temp of 101F. She received IV vancomycin  and IV zosyn in ED. Her blood pressure this early morning was in 80s, responded well with IV fluid bolus. Patient already started feeling better. No chest pain, diarrhea, sob.       1. Sepsis-due to Enterococcus  fecalis UTI  -BC negative, urine culture + enterococcus fecalis    -Continue IV unasyn day 5   -plan for uteroscopy and lithotripsy 9/30 with Dr. De La Cruz   -ID reccs reviewed.     2. Hypothyroidism  Continue synthroid    3. S/P right ureteric stent  last month  -out pt removal of stent after lithotripsy      4. DVT prophylaxis.   IMPROVE VTE Individual Risk Assessment 1   -activity as tolerated.     plan discussed with patient and RN at bedside. 60 y/o F with h/o hypothyroidism, kidney stone and was admitted from 8/10-8/12 for urosepsis with right ureteral stent by  Dr. Flex De La Cruz and d/c her on Cipro p/w recurrent symptoms of weakness/fatigue, n/v/ and abdominal pain progressively worsening over the last 24 hours.  She also complaining of fever and chills. In ED she was found to have temp of 101F. She received IV vancomycin  and IV zosyn in ED. Her blood pressure this early morning was in 80s, responded well with IV fluid bolus. Patient already started feeling better. No chest pain, diarrhea, sob.       1. Sepsis-due to Enterococcus  fecalis UTI   -BC negative, urine culture + enterococcus fecalis    -leukocytosis - improving   -Continue IV unasyn day 5   -plan for uteroscopy and lithotripsy 9/30 with Dr. De La Cruz   -ID reccs reviewed.     2. Hypothyroidism  Continue synthroid    3. S/P right ureteric stent  last month  -out pt removal of stent after lithotripsy      4. DVT prophylaxis.   IMPROVE VTE Individual Risk Assessment 1   -activity as tolerated.     plan discussed with patient and RN at bedside.

## 2020-09-28 NOTE — PROGRESS NOTE ADULT - SUBJECTIVE AND OBJECTIVE BOX
Patient seen and examined at bedside. Pt with no acute complaints. Denies any fevers, chills, abd/flank pain.    General: No distress, No anxiety  VITALS  T(C): 36.7 (09-28-20 @ 08:27), Max: 36.9 (09-27-20 @ 23:42)  HR: 76 (09-28-20 @ 08:27) (76 - 92)  BP: 114/73 (09-28-20 @ 08:27) (98/64 - 119/72)  RR: 17 (09-28-20 @ 08:27) (17 - 18)  SpO2: 95% (09-28-20 @ 08:27) (93% - 98%)            Skin     : No jaundice, No lesions, No swelling  HEENT: No icterus , EOM full , No epistaxis  Abdo:   : Soft, Non tender, No guarding, No distension   Back    : No CVAT  Extremity: No calf tenderness  Genitalia Female: No Carmichael  Neuro   : A&Ox3    Culture - Urine (09.23.20 @ 22:53)   - Ampicillin: S <=2 Predicts results to ampicillin/sulbactam, amoxacillin-clavulanate and piperacillin-tazobactam.   - Vancomycin: S 2   - Ciprofloxacin: S <=1   - Levofloxacin: S <=1   - Tetra/Doxy: R >8   - Nitrofurantoin: S <=32 Should not be used to treat pyelonephritis.   Specimen Source: .Urine Clean Catch (Midstream)   Culture Results:   >100,000 CFU/ml Enterococcus faecalis   Organism Identification: Enterococcus faecalis   Organism: Enterococcus faecalis   Method Type: NETTE Culture - Blood (09.23.20 @ 22:53)   Specimen Source: .Blood Blood-Peripheral   Culture Results:   No growth to date. Culture - Blood (09.23.20 @ 22:53)   Specimen Source: .Blood Blood-Peripheral   Culture Results:   No growth to date.

## 2020-09-29 LAB
ALBUMIN SERPL ELPH-MCNC: 3.1 G/DL — LOW (ref 3.3–5)
ALP SERPL-CCNC: 96 U/L — SIGNIFICANT CHANGE UP (ref 40–120)
ALT FLD-CCNC: 36 U/L — SIGNIFICANT CHANGE UP (ref 12–78)
ANION GAP SERPL CALC-SCNC: 3 MMOL/L — LOW (ref 5–17)
AST SERPL-CCNC: 26 U/L — SIGNIFICANT CHANGE UP (ref 15–37)
BILIRUB SERPL-MCNC: 0.3 MG/DL — SIGNIFICANT CHANGE UP (ref 0.2–1.2)
BUN SERPL-MCNC: 7 MG/DL — SIGNIFICANT CHANGE UP (ref 7–23)
CALCIUM SERPL-MCNC: 9.5 MG/DL — SIGNIFICANT CHANGE UP (ref 8.5–10.1)
CHLORIDE SERPL-SCNC: 109 MMOL/L — HIGH (ref 96–108)
CO2 SERPL-SCNC: 29 MMOL/L — SIGNIFICANT CHANGE UP (ref 22–31)
CREAT SERPL-MCNC: 0.68 MG/DL — SIGNIFICANT CHANGE UP (ref 0.5–1.3)
CULTURE RESULTS: SIGNIFICANT CHANGE UP
CULTURE RESULTS: SIGNIFICANT CHANGE UP
GLUCOSE SERPL-MCNC: 88 MG/DL — SIGNIFICANT CHANGE UP (ref 70–99)
HCT VFR BLD CALC: 36.7 % — SIGNIFICANT CHANGE UP (ref 34.5–45)
HGB BLD-MCNC: 11.6 G/DL — SIGNIFICANT CHANGE UP (ref 11.5–15.5)
MCHC RBC-ENTMCNC: 28.4 PG — SIGNIFICANT CHANGE UP (ref 27–34)
MCHC RBC-ENTMCNC: 31.6 GM/DL — LOW (ref 32–36)
MCV RBC AUTO: 90 FL — SIGNIFICANT CHANGE UP (ref 80–100)
PLATELET # BLD AUTO: 305 K/UL — SIGNIFICANT CHANGE UP (ref 150–400)
POTASSIUM SERPL-MCNC: 4.9 MMOL/L — SIGNIFICANT CHANGE UP (ref 3.5–5.3)
POTASSIUM SERPL-SCNC: 4.9 MMOL/L — SIGNIFICANT CHANGE UP (ref 3.5–5.3)
PROT SERPL-MCNC: 7.5 GM/DL — SIGNIFICANT CHANGE UP (ref 6–8.3)
RBC # BLD: 4.08 M/UL — SIGNIFICANT CHANGE UP (ref 3.8–5.2)
RBC # FLD: 13.8 % — SIGNIFICANT CHANGE UP (ref 10.3–14.5)
SARS-COV-2 RNA SPEC QL NAA+PROBE: SIGNIFICANT CHANGE UP
SODIUM SERPL-SCNC: 141 MMOL/L — SIGNIFICANT CHANGE UP (ref 135–145)
SPECIMEN SOURCE: SIGNIFICANT CHANGE UP
SPECIMEN SOURCE: SIGNIFICANT CHANGE UP
WBC # BLD: 5.25 K/UL — SIGNIFICANT CHANGE UP (ref 3.8–10.5)
WBC # FLD AUTO: 5.25 K/UL — SIGNIFICANT CHANGE UP (ref 3.8–10.5)

## 2020-09-29 PROCEDURE — 99233 SBSQ HOSP IP/OBS HIGH 50: CPT

## 2020-09-29 RX ADMIN — AMPICILLIN SODIUM AND SULBACTAM SODIUM 200 GRAM(S): 250; 125 INJECTION, POWDER, FOR SUSPENSION INTRAMUSCULAR; INTRAVENOUS at 23:29

## 2020-09-29 RX ADMIN — AMPICILLIN SODIUM AND SULBACTAM SODIUM 200 GRAM(S): 250; 125 INJECTION, POWDER, FOR SUSPENSION INTRAMUSCULAR; INTRAVENOUS at 05:12

## 2020-09-29 RX ADMIN — AMPICILLIN SODIUM AND SULBACTAM SODIUM 200 GRAM(S): 250; 125 INJECTION, POWDER, FOR SUSPENSION INTRAMUSCULAR; INTRAVENOUS at 17:37

## 2020-09-29 RX ADMIN — AMPICILLIN SODIUM AND SULBACTAM SODIUM 200 GRAM(S): 250; 125 INJECTION, POWDER, FOR SUSPENSION INTRAMUSCULAR; INTRAVENOUS at 11:10

## 2020-09-29 RX ADMIN — ENOXAPARIN SODIUM 40 MILLIGRAM(S): 100 INJECTION SUBCUTANEOUS at 11:08

## 2020-09-29 RX ADMIN — Medication 100 MICROGRAM(S): at 05:12

## 2020-09-29 NOTE — PROGRESS NOTE ADULT - ASSESSMENT
60 y/o Female with h/o hypothyroidism, kidney stones, recent hospitalization for urosepsis (8/10-8/12) s/p right ureteral stent by  Dr. Flex De La Cruz and treated with Cipro was admitted on 9/24 for recurrent symptoms of weakness/fatigue, n/v/ and abdominal pain progressively worsening over the last 24 hours.  She also had fever and chills at home. In ED she was found to have temp of 101F and received IV vancomycin  and IV zosyn, then ceftriaxone. Her blood pressure was in 80s, responded with IV fluid bolus.     1. UTI with ENFA. Kidney stones. Right hydronephrosis with nephroureteral stent in place.   -leukocytosis  -recurrent infection  -BC x 2, urine c/s reviewed  -on unasyn 3 gm IV q6h # 3  -tolerating abx well so far; no side effects noted  -plan for lithotripsy and ureteral stent removal in AM  -urology evaluation appreciated  -continue IV abx coverage  -monitor temps  -f/u CBC  -supportive care  2. Other issues:   -care per medicine

## 2020-09-29 NOTE — PROGRESS NOTE ADULT - SUBJECTIVE AND OBJECTIVE BOX
seen and examined at bedside     no co       ros all others are neg     no flank bibiana   no fc     ICU Vital Signs Last 24 Hrs  T(C): 36.7 (29 Sep 2020 08:01), Max: 36.9 (28 Sep 2020 23:48)  T(F): 98.1 (29 Sep 2020 08:01), Max: 98.4 (28 Sep 2020 23:48)  HR: 75 (29 Sep 2020 08:01) (70 - 86)  BP: 114/72 (29 Sep 2020 08:01) (109/68 - 124/68)  BP(mean): --  ABP: --  ABP(mean): --  RR: 18 (29 Sep 2020 08:01) (16 - 18)  SpO2: 95% (29 Sep 2020 08:01) (94% - 96%)                          11.6   5.25  )-----------( 305      ( 29 Sep 2020 08:12 )             36.7   09-29    141  |  109<H>  |  7   ----------------------------<  88  4.9   |  29  |  0.68    Ca    9.5      29 Sep 2020 08:12    TPro  7.5  /  Alb  3.1<L>  /  TBili  0.3  /  DBili  x   /  AST  26  /  ALT  36  /  AlkPhos  96  09-29    Home Medications:  Synthroid 100 mcg (0.1 mg) oral tablet: 1 tab(s) orally once a day (in the morning) (22 Sep 2020 08:22)  Vitamin D3 2000 intl units (50 mcg) oral tablet: 2 tab(s) orally once a day (22 Sep 2020 08:22)      MEDICATIONS  (PRN):  acetaminophen   Tablet .. 650 milliGRAM(s) Oral every 6 hours PRN Mild Pain (1 - 3)  aluminum hydroxide/magnesium hydroxide/simethicone Suspension 30 milliLiter(s) Oral every 4 hours PRN Dyspepsia  ondansetron Injectable 4 milliGRAM(s) IV Push every 6 hours PRN Nausea and/or Vomiting

## 2020-09-29 NOTE — PROGRESS NOTE ADULT - SUBJECTIVE AND OBJECTIVE BOX
Date of service: 20 @ 09:20    Lying in bed in NAD  Feels stronger  Has urinary frequency    ROS: no fever or chills; denies dizziness, no HA, no SOB or cough, no abdominal pain, no diarrhea or constipation; no legs pain, no rashes    MEDICATIONS  (STANDING):  ampicillin/sulbactam  IVPB 3 Gram(s) IV Intermittent every 6 hours  enoxaparin Injectable 40 milliGRAM(s) SubCutaneous daily  levothyroxine 100 MICROGram(s) Oral daily    Vital Signs Last 24 Hrs  T(C): 36.7 (29 Sep 2020 08:01), Max: 36.9 (28 Sep 2020 23:48)  T(F): 98.1 (29 Sep 2020 08:01), Max: 98.4 (28 Sep 2020 23:48)  HR: 75 (29 Sep 2020 08:01) (70 - 86)  BP: 114/72 (29 Sep 2020 08:01) (109/68 - 124/68)  BP(mean): --  RR: 18 (29 Sep 2020 08:01) (16 - 18)  SpO2: 95% (29 Sep 2020 08:01) (94% - 96%)     Physical exam:    Constitutional:  No acute distress  HEENT: NC/AT, EOMI, PERRLA, conjunctivae clear  Neck: supple; thyroid not palpable  Back: no tenderness  Respiratory: respiratory effort normal; clear to auscultation  Cardiovascular: S1S2 regular, no murmurs  Abdomen: soft, not tender, not distended, positive BS  Genitourinary: has suprapubic tenderness  Lymphatic: no LN palpable  Musculoskeletal: no muscle tenderness, no joint swelling or tenderness  Extremities: no pedal edema  Neurological/ Psychiatric: AxOx3, moving all extremities  Skin: no rashes; no palpable lesions    Labs: reviewed                        11.6   5.25  )-----------( 305      ( 29 Sep 2020 08:12 )             36.7         141  |  109<H>  |  7   ----------------------------<  88  4.9   |  29  |  0.68    Ca    9.5      29 Sep 2020 08:12    TPro  7.5  /  Alb  3.1<L>  /  TBili  0.3  /  DBili  x   /  AST  26  /  ALT  36  /  AlkPhos  96  09-                        10.4   6.06  )-----------( 210      ( 25 Sep 2020 08:52 )             32.7     09-25    141  |  111<H>  |  7   ----------------------------<  86  3.9   |  25  |  0.56    Ca    8.4<L>      25 Sep 2020 08:52    TPro  8.4<H>  /  Alb  3.6  /  TBili  0.9  /  DBili  x   /  AST  32  /  ALT  28  /  AlkPhos  108  0923     LIVER FUNCTIONS - ( 23 Sep 2020 22:53 )  Alb: 3.6 g/dL / Pro: 8.4 gm/dL / ALK PHOS: 108 U/L / ALT: 28 U/L / AST: 32 U/L / GGT: x           Urinalysis Basic - ( 23 Sep 2020 22:53 )    Color: Yellow / Appearance: Clear / S.015 / pH: x  Gluc: x / Ketone: Trace  / Bili: Negative / Urobili: 1 mg/dL   Blood: x / Protein: 30 mg/dL / Nitrite: Negative   Leuk Esterase: Moderate / RBC: 11-25 /HPF / WBC 11-25   Sq Epi: x / Non Sq Epi: Many / Bacteria: Many      Culture - Urine (collected 23 Sep 2020 22:53)  Source: .Urine Clean Catch (Midstream)  Preliminary Report (25 Sep 2020 04:33):    >100,000 CFU/ml Enterococcus species    Culture - Blood (collected 23 Sep 2020 22:53)  Source: .Blood Blood-Peripheral  Preliminary Report (25 Sep 2020 06:01):    No growth to date.    Culture - Blood (collected 23 Sep 2020 22:53)  Source: .Blood Blood-Peripheral  Preliminary Report (25 Sep 2020 06:01):    No growth to date.    Culture - Urine (collected 22 Sep 2020 08:46)  Source: .Urine None  Final Report (24 Sep 2020 19:29):    >100,000 CFU/ml Enterococcus faecalis  Organism: Enterococcus faecalis (24 Sep 2020 19:29)  Organism: Enterococcus faecalis (24 Sep 2020 19:29)      -  Ampicillin: S <=2 Predicts results to ampicillin/sulbactam, amoxacillin-clavulanate and  piperacillin-tazobactam.      -  Ciprofloxacin: S <=1      -  Levofloxacin: S <=1      -  Nitrofurantoin: S <=32 Should not be used to treat pyelonephritis.      -  Tetra/Doxy: R >8      -  Vancomycin: S 2      Method Type: NETTE      COVID-19 PCR: NotDetec (20 @ 04:44)    Radiology: all available radiological tests reviewed    < from: CT Abdomen and Pelvis No Cont (20 @ 23:27) >  KIDNEYS/URETERS: Mild right hydronephrosis and moderate dilatation of the right renal pelvis, slightly increased since the previous examination, with similar positioning of right nephroureteral stent, and slightly decreased dilatation of the right ureter. Stable 5 mm distal right ureteral calculus. Trace right perinephric fat stranding, slightly increased, with mild improvement in periureteral inflammatory fat stranding.    < end of copied text >      Advanced directives addressed: full resuscitation

## 2020-09-29 NOTE — PROGRESS NOTE ADULT - NUTRITIONAL ASSESSMENT
60 y/o F with h/o hypothyroidism, kidney stone and was admitted from 8/10-8/12 for urosepsis with right ureteral stent by  Dr. Flex De La Cruz and d/c her on Cipro p/w recurrent symptoms of weakness/fatigue, n/v/ and abdominal pain progressively worsening over the last 24 hours.  She also complaining of fever and chills. In ED she was found to have temp of 101F. She received IV vancomycin  and IV zosyn in ED. Her blood pressure this early morning was in 80s, responded well with IV fluid bolus. Patient already started feeling better. No chest pain, diarrhea, sob.       1. Sepsis-due to Enterococcus  fecalis UTI renal stone plan for lithotripsy in am   NPO af mn   urology and ID team assist appreciated   Continue  IV  unasyn, blood cx   ID eval appreciated   eval appreciated    2. Hypothyroidism  Continue synthroid    3. S/P right ureteric stent  last month   follow up    4. DVT prophylaxis. with lovenox once out of sx bleed risk window    reviewed with patient plan of care   full code

## 2020-09-30 ENCOUNTER — RESULT REVIEW (OUTPATIENT)
Age: 60
End: 2020-09-30

## 2020-09-30 ENCOUNTER — TRANSCRIPTION ENCOUNTER (OUTPATIENT)
Age: 60
End: 2020-09-30

## 2020-09-30 ENCOUNTER — APPOINTMENT (OUTPATIENT)
Dept: UROLOGY | Facility: HOSPITAL | Age: 60
End: 2020-09-30

## 2020-09-30 VITALS
HEART RATE: 89 BPM | SYSTOLIC BLOOD PRESSURE: 118 MMHG | DIASTOLIC BLOOD PRESSURE: 74 MMHG | OXYGEN SATURATION: 96 % | TEMPERATURE: 98 F | RESPIRATION RATE: 18 BRPM

## 2020-09-30 LAB
ANION GAP SERPL CALC-SCNC: 4 MMOL/L — LOW (ref 5–17)
BUN SERPL-MCNC: 8 MG/DL — SIGNIFICANT CHANGE UP (ref 7–23)
CALCIUM SERPL-MCNC: 9.6 MG/DL — SIGNIFICANT CHANGE UP (ref 8.5–10.1)
CHLORIDE SERPL-SCNC: 107 MMOL/L — SIGNIFICANT CHANGE UP (ref 96–108)
CO2 SERPL-SCNC: 30 MMOL/L — SIGNIFICANT CHANGE UP (ref 22–31)
CREAT SERPL-MCNC: 0.72 MG/DL — SIGNIFICANT CHANGE UP (ref 0.5–1.3)
GLUCOSE SERPL-MCNC: 79 MG/DL — SIGNIFICANT CHANGE UP (ref 70–99)
HCT VFR BLD CALC: 39.5 % — SIGNIFICANT CHANGE UP (ref 34.5–45)
HGB BLD-MCNC: 12.5 G/DL — SIGNIFICANT CHANGE UP (ref 11.5–15.5)
MCHC RBC-ENTMCNC: 28.5 PG — SIGNIFICANT CHANGE UP (ref 27–34)
MCHC RBC-ENTMCNC: 31.6 GM/DL — LOW (ref 32–36)
MCV RBC AUTO: 90.2 FL — SIGNIFICANT CHANGE UP (ref 80–100)
PLATELET # BLD AUTO: 363 K/UL — SIGNIFICANT CHANGE UP (ref 150–400)
POTASSIUM SERPL-MCNC: 4.2 MMOL/L — SIGNIFICANT CHANGE UP (ref 3.5–5.3)
POTASSIUM SERPL-SCNC: 4.2 MMOL/L — SIGNIFICANT CHANGE UP (ref 3.5–5.3)
RBC # BLD: 4.38 M/UL — SIGNIFICANT CHANGE UP (ref 3.8–5.2)
RBC # FLD: 13.8 % — SIGNIFICANT CHANGE UP (ref 10.3–14.5)
SODIUM SERPL-SCNC: 141 MMOL/L — SIGNIFICANT CHANGE UP (ref 135–145)
WBC # BLD: 5.52 K/UL — SIGNIFICANT CHANGE UP (ref 3.8–10.5)
WBC # FLD AUTO: 5.52 K/UL — SIGNIFICANT CHANGE UP (ref 3.8–10.5)

## 2020-09-30 PROCEDURE — 52352 CYSTOURETERO W/STONE REMOVE: CPT | Mod: RT

## 2020-09-30 PROCEDURE — 88300 SURGICAL PATH GROSS: CPT | Mod: 26

## 2020-09-30 PROCEDURE — 99239 HOSP IP/OBS DSCHRG MGMT >30: CPT

## 2020-09-30 RX ORDER — OXYBUTYNIN CHLORIDE 5 MG
5 TABLET ORAL ONCE
Refills: 0 | Status: COMPLETED | OUTPATIENT
Start: 2020-09-30 | End: 2020-09-30

## 2020-09-30 RX ORDER — LEVOTHYROXINE SODIUM 125 MCG
100 TABLET ORAL DAILY
Refills: 0 | Status: DISCONTINUED | OUTPATIENT
Start: 2020-09-30 | End: 2020-09-30

## 2020-09-30 RX ORDER — ENOXAPARIN SODIUM 100 MG/ML
40 INJECTION SUBCUTANEOUS DAILY
Refills: 0 | Status: DISCONTINUED | OUTPATIENT
Start: 2020-09-30 | End: 2020-09-30

## 2020-09-30 RX ORDER — ONDANSETRON 8 MG/1
4 TABLET, FILM COATED ORAL EVERY 6 HOURS
Refills: 0 | Status: DISCONTINUED | OUTPATIENT
Start: 2020-09-30 | End: 2020-09-30

## 2020-09-30 RX ORDER — PHENAZOPYRIDINE HCL 100 MG
200 TABLET ORAL ONCE
Refills: 0 | Status: COMPLETED | OUTPATIENT
Start: 2020-09-30 | End: 2020-09-30

## 2020-09-30 RX ORDER — OXYBUTYNIN CHLORIDE 5 MG
5 TABLET ORAL THREE TIMES A DAY
Refills: 0 | Status: DISCONTINUED | OUTPATIENT
Start: 2020-09-30 | End: 2020-09-30

## 2020-09-30 RX ORDER — PHENAZOPYRIDINE HCL 100 MG
200 TABLET ORAL THREE TIMES A DAY
Refills: 0 | Status: DISCONTINUED | OUTPATIENT
Start: 2020-09-30 | End: 2020-09-30

## 2020-09-30 RX ORDER — ACETAMINOPHEN 500 MG
650 TABLET ORAL EVERY 6 HOURS
Refills: 0 | Status: DISCONTINUED | OUTPATIENT
Start: 2020-09-30 | End: 2020-09-30

## 2020-09-30 RX ADMIN — Medication 5 MILLIGRAM(S): at 11:30

## 2020-09-30 RX ADMIN — AMPICILLIN SODIUM AND SULBACTAM SODIUM 200 GRAM(S): 250; 125 INJECTION, POWDER, FOR SUSPENSION INTRAMUSCULAR; INTRAVENOUS at 12:10

## 2020-09-30 RX ADMIN — AMPICILLIN SODIUM AND SULBACTAM SODIUM 200 GRAM(S): 250; 125 INJECTION, POWDER, FOR SUSPENSION INTRAMUSCULAR; INTRAVENOUS at 05:25

## 2020-09-30 RX ADMIN — Medication 100 MICROGRAM(S): at 12:10

## 2020-09-30 RX ADMIN — Medication 200 MILLIGRAM(S): at 11:30

## 2020-09-30 NOTE — DISCHARGE NOTE PROVIDER - NSDCCPCAREPLAN_GEN_ALL_CORE_FT
PRINCIPAL DISCHARGE DIAGNOSIS  Diagnosis: Sepsis, due to unspecified organism, unspecified whether acute organ dysfunction present  Assessment and Plan of Treatment: Enterococcus UTI severe sepsis, R renal stone, stent      SECONDARY DISCHARGE DIAGNOSES  Diagnosis: Urinary tract infection without hematuria, site unspecified  Assessment and Plan of Treatment:

## 2020-09-30 NOTE — DISCHARGE NOTE PROVIDER - HOSPITAL COURSE
· Nutritional Assessment  60 y/o F with h/o hypothyroidism, R kidney stone and was admitted from 8/10-8/12 for urosepsis with right ureteral stent by  Dr. Flex De La Cruz and d/c her on Cipro     p/w recurrent symptoms of weakness/fatigue, n/v/ and abdominal pain progressively worsening over the last 24 hours.      She also complaining of fever and chills. In ED she was found to have temp of 101F.   She received IV vancomycin  and IV zosyn in ED. Her blood pressure this early morning was in 80s, responded well with IV fluid bolus.     admitted for       1. Sepsis-due to Enterococcus  fecalis UTI renal stone sp lithotripsy stone removed.   blood cx ntd   ID and  teams seen and plan for augementin x 10 days    2. Hypothyroidism euthyroid clinically   Continue home synthroid      reviewed with patient plan of care   ffu with urology and pcp 1 week   greater than 35 mins dc time      · Nutritional Assessment  60 y/o F with h/o hypothyroidism, R kidney stone and was admitted from 8/10-8/12 for urosepsis with right ureteral stent by  Dr. Flex De La Cruz and d/c her on Cipro     p/w recurrent symptoms of weakness/fatigue, n/v/ and abdominal pain progressively worsening over the last 24 hours.      She also complaining of fever and chills. In ED she was found to have temp of 101F.   She received IV vancomycin  and IV zosyn in ED. Her blood pressure this early morning was in 80s, responded well with IV fluid bolus.     admitted for       1. Sepsis-due to Enterococcus  fecalis UTI renal stone sp lithotripsy stone removed see op note below         blood cx ntd   ID and  teams seen and plan for augementin x 10 days    2. Hypothyroidism euthyroid clinically   Continue home synthroid      reviewed with patient plan of care   ffu with urology and pcp 1 week   greater than 35 mins dc time     9-30-20 operative note by urology team   · Operative Findings  RIGHT ureteral stone extracted with basket. RIGHT ureteral stent in good position after replacement    Specimens/Blood Loss/IV/Output/Protocol/VTE:   Specimens/Blood Loss/IV/Output/Protocol/VTE   · Specimens  RIGHT ureteral stone  · Drains  6x24 RIGHT ureteral stent

## 2020-09-30 NOTE — DISCHARGE NOTE NURSING/CASE MANAGEMENT/SOCIAL WORK - PATIENT PORTAL LINK FT
You can access the FollowMyHealth Patient Portal offered by [facility] by registering at the following website: [email or web address]. By joining [facility]’s FollowMyHealth portal, you will also be able to view your health information using other applications (apps) compatible with our system.

## 2020-09-30 NOTE — PROGRESS NOTE ADULT - ASSESSMENT
60 y/o Female with h/o hypothyroidism, kidney stones, recent hospitalization for urosepsis (8/10-8/12) s/p right ureteral stent by  Dr. Flex De La Cruz and treated with Cipro was admitted on 9/24 for recurrent symptoms of weakness/fatigue, n/v/ and abdominal pain progressively worsening over the last 24 hours.  She also had fever and chills at home. In ED she was found to have temp of 101F and received IV vancomycin  and IV zosyn, then ceftriaxone. Her blood pressure was in 80s, responded with IV fluid bolus.     1. UTI with ENFA. Kidney stones. Right hydronephrosis with nephroureteral stent in place.   -leukocytosis  -recurrent infection  -BC x 2, urine c/s reviewed  -on unasyn 3 gm IV q6h # 4  -tolerating abx well so far; no side effects noted  -s/p ureteral stent removal in AM  -urology evaluation appreciated  -may change abx to augmentin 875 gm PO q12h for 10 more days  -monitor temps  -f/u CBC  -supportive care  2. Other issues:   -care per medicine

## 2020-09-30 NOTE — PROGRESS NOTE ADULT - PROVIDER SPECIALTY LIST ADULT
Hospitalist
Infectious Disease
Urology
Hospitalist
Hospitalist

## 2020-09-30 NOTE — DISCHARGE NOTE PROVIDER - CARE PROVIDER_API CALL
Gisele Dya  INTERNAL MEDICINE  [address] [place] [address]  Phone: [phone]  Fax: [phone]  Follow Up Time:

## 2020-09-30 NOTE — DISCHARGE NOTE PROVIDER - NSDCMRMEDTOKEN_GEN_ALL_CORE_FT
amoxicillin-clavulanate 875 mg-125 mg oral tablet: 1 tab(s) orally every 12 hours  Synthroid 100 mcg (0.1 mg) oral tablet: 1 tab(s) orally once a day (in the morning)  Vitamin D3 2000 intl units (50 mcg) oral tablet: 2 tab(s) orally once a day

## 2020-09-30 NOTE — PROGRESS NOTE ADULT - REASON FOR ADMISSION
Fever, chills

## 2020-09-30 NOTE — PROGRESS NOTE ADULT - SUBJECTIVE AND OBJECTIVE BOX
Date of service: 20 @ 12:51    Lying in bed in NAD  Events noted  s/p stone removal ans stent replacement  Denies pain  Has urinary frequency    ROS: no fever or chills; denies dizziness, no HA, no SOB or cough, no abdominal pain, no diarrhea or constipation; no legs pain, no rashes    MEDICATIONS  (STANDING):  ampicillin/sulbactam  IVPB 3 Gram(s) IV Intermittent every 6 hours  enoxaparin Injectable 40 milliGRAM(s) SubCutaneous daily  levothyroxine 100 MICROGram(s) Oral daily    Vital Signs Last 24 Hrs  T(C): 36.3 (30 Sep 2020 12:11), Max: 37.7 (30 Sep 2020 11:10)  T(F): 97.4 (30 Sep 2020 12:11), Max: 99.8 (30 Sep 2020 11:10)  HR: 65 (30 Sep 2020 12:11) (65 - 80)  BP: 129/61 (30 Sep 2020 12:11) (100/64 - 129/61)  BP(mean): --  RR: 18 (30 Sep 2020 12:11) (16 - 21)  SpO2: 95% (30 Sep 2020 12:11) (94% - 99%)     Physical exam:    Constitutional:  No acute distress  HEENT: NC/AT, EOMI, PERRLA, conjunctivae clear  Neck: supple; thyroid not palpable  Back: no tenderness  Respiratory: respiratory effort normal; clear to auscultation  Cardiovascular: S1S2 regular, no murmurs  Abdomen: soft, not tender, not distended, positive BS  Genitourinary: has suprapubic tenderness  Lymphatic: no LN palpable  Musculoskeletal: no muscle tenderness, no joint swelling or tenderness  Extremities: no pedal edema  Neurological/ Psychiatric: AxOx3, moving all extremities  Skin: no rashes; no palpable lesions    Labs: reviewed                        11.6   5.25  )-----------( 305      ( 29 Sep 2020 08:12 )             36.7         141  |  109<H>  |  7   ----------------------------<  88  4.9   |  29  |  0.68    Ca    9.5      29 Sep 2020 08:12    TPro  7.5  /  Alb  3.1<L>  /  TBili  0.3  /  DBili  x   /  AST  26  /  ALT  36  /  AlkPhos  96  -                        10.4   6.06  )-----------( 210      ( 25 Sep 2020 08:52 )             32.7     09-    141  |  111<H>  |  7   ----------------------------<  86  3.9   |  25  |  0.56    Ca    8.4<L>      25 Sep 2020 08:52    TPro  8.4<H>  /  Alb  3.6  /  TBili  0.9  /  DBili  x   /  AST  32  /  ALT  28  /  AlkPhos  108  09-23     LIVER FUNCTIONS - ( 23 Sep 2020 22:53 )  Alb: 3.6 g/dL / Pro: 8.4 gm/dL / ALK PHOS: 108 U/L / ALT: 28 U/L / AST: 32 U/L / GGT: x           Urinalysis Basic - ( 23 Sep 2020 22:53 )    Color: Yellow / Appearance: Clear / S.015 / pH: x  Gluc: x / Ketone: Trace  / Bili: Negative / Urobili: 1 mg/dL   Blood: x / Protein: 30 mg/dL / Nitrite: Negative   Leuk Esterase: Moderate / RBC: 11-25 /HPF / WBC 11-25   Sq Epi: x / Non Sq Epi: Many / Bacteria: Many      Culture - Urine (collected 23 Sep 2020 22:53)  Source: .Urine Clean Catch (Midstream)  Preliminary Report (25 Sep 2020 04:33):    >100,000 CFU/ml Enterococcus species    Culture - Blood (collected 23 Sep 2020 22:53)  Source: .Blood Blood-Peripheral  Preliminary Report (25 Sep 2020 06:01):    No growth to date.    Culture - Blood (collected 23 Sep 2020 22:53)  Source: .Blood Blood-Peripheral  Preliminary Report (25 Sep 2020 06:01):    No growth to date.    Culture - Urine (collected 22 Sep 2020 08:46)  Source: .Urine None  Final Report (24 Sep 2020 19:29):    >100,000 CFU/ml Enterococcus faecalis  Organism: Enterococcus faecalis (24 Sep 2020 19:29)  Organism: Enterococcus faecalis (24 Sep 2020 19:29)      -  Ampicillin: S <=2 Predicts results to ampicillin/sulbactam, amoxacillin-clavulanate and  piperacillin-tazobactam.      -  Ciprofloxacin: S <=1      -  Levofloxacin: S <=1      -  Nitrofurantoin: S <=32 Should not be used to treat pyelonephritis.      -  Tetra/Doxy: R >8      -  Vancomycin: S 2      Method Type: NETTE      COVID-19 PCR: NotDetec (20 @ 04:44)    Radiology: all available radiological tests reviewed    < from: CT Abdomen and Pelvis No Cont (20 @ 23:27) >  KIDNEYS/URETERS: Mild right hydronephrosis and moderate dilatation of the right renal pelvis, slightly increased since the previous examination, with similar positioning of right nephroureteral stent, and slightly decreased dilatation of the right ureter. Stable 5 mm distal right ureteral calculus. Trace right perinephric fat stranding, slightly increased, with mild improvement in periureteral inflammatory fat stranding.    < end of copied text >      Advanced directives addressed: full resuscitation

## 2020-10-06 ENCOUNTER — APPOINTMENT (OUTPATIENT)
Dept: UROLOGY | Facility: CLINIC | Age: 60
End: 2020-10-06
Payer: COMMERCIAL

## 2020-10-06 VITALS
TEMPERATURE: 97.6 F | SYSTOLIC BLOOD PRESSURE: 137 MMHG | DIASTOLIC BLOOD PRESSURE: 79 MMHG | HEART RATE: 109 BPM | OXYGEN SATURATION: 97 %

## 2020-10-06 DIAGNOSIS — R33.9 RETENTION OF URINE, UNSPECIFIED: ICD-10-CM

## 2020-10-06 DIAGNOSIS — N20.0 CALCULUS OF KIDNEY: ICD-10-CM

## 2020-10-06 PROCEDURE — 52310 CYSTOSCOPY AND TREATMENT: CPT

## 2020-10-20 ENCOUNTER — NON-APPOINTMENT (OUTPATIENT)
Age: 60
End: 2020-10-20

## 2020-10-22 ENCOUNTER — APPOINTMENT (OUTPATIENT)
Dept: UROGYNECOLOGY | Facility: CLINIC | Age: 60
End: 2020-10-22
Payer: COMMERCIAL

## 2020-10-22 DIAGNOSIS — R32 UNSPECIFIED URINARY INCONTINENCE: ICD-10-CM

## 2020-10-22 DIAGNOSIS — N81.4 UTEROVAGINAL PROLAPSE, UNSPECIFIED: ICD-10-CM

## 2020-10-22 PROCEDURE — 99214 OFFICE O/P EST MOD 30 MIN: CPT

## 2020-10-22 NOTE — HISTORY OF PRESENT ILLNESS
[FreeTextEntry1] : Here to discuss options for surgery. In August she was hospitalized for sepsis and found to have a large stone. She had a stent placed and went home. She went back to hospital and was septic again in September. Got IV antibiotic and then had the stone removed and another stent in. She had the recovery stent out October 6th. She feels good. We reviewed R/B/A BSO. Wants tubes out and ovaries left in. We reviewed her UDTs which showed leaking at capacity. She wants surgery for her prolapse soon.

## 2020-10-22 NOTE — REASON FOR VISIT
[Pelvic Organ Prolapse] : pelvic organ prolapse [Recurrent Urinary Infections] : recurrent urinary infections

## 2020-10-22 NOTE — DISCUSSION/SUMMARY
[FreeTextEntry1] : Ms. Couch presented to the office today for counseling regarding her decision for possible pelvic reconstructive surgery. All pertinent prior studies including urodynamic were reviewed. 						\par She was counseled regarding alternative non-surgical therapies as well as the prognosis with no intervention.\par \par The patient was advised regarding various surgical options including abdominal, robotic/laparascopic and vaginal approaches. The risks and benefits of surgery using endogenous tissue only versus the use of graft insertion (mesh) were fully reviewed.  She was informed of the potential for improved durability and the inherent risk of graft use including but not limited to infection, erosion, pain, pain with intercourse, cervical elongation, disturbance in bowel or bladder function, any of which may require additional surgery for mesh revision.  She is aware that the mesh used in her surgery is a permanent mesh.  \par \par The general risks of the surgery were reviewed including, but not limited to infection, bleeding, surrounding organ or tissue injury, failure meaning recurrent prolapse, leaking, voiding dysfunction, needing to go home with a catheter and the risks of anesthesia.\par \par The approximate length of the surgery, hospital stay and postoperative recovery period were reviewed, including a general overview of convalescence and postoperative followup.\par \par The patient is aware that learner's (medical students/residents/fellows) may be participating in a pelvic exam under anesthesia.\par \par The patient verbalized a desire to proceed with the surgery.  Appropriate informed consent was obtained Robotic [facility] Sling and cysto.  All questions were answered to the patient's satisfaction and we are looking to schedule her.\par \par

## 2020-10-29 NOTE — CDI QUERY NOTE - NSCDIOTHERTXTBX_GEN_ALL_CORE_HH
Sepsis present on admission due to UTI and associated to right ureteric stent    WBC 14 -  - 101F     Discharge Summary:    Care Plan/Procedures:  Goal(s)	To get better and follow your care plan as instructed.  Discharge Diagnoses, Assessment and Plan of Treatment	PRINCIPAL DISCHARGE DIAGNOSIS  Diagnosis: Sepsis, due to unspecified organism, unspecified whether acute organ dysfunction present  Assessment and Plan of Treatment: Enterococcus UTI severe sepsis, R renal stone, stent      SECONDARY DISCHARGE DIAGNOSES  Diagnosis: Urinary tract infection without hematuria, site unspecified  Assessment and Plan of Treatment:    Please clarify if Severe Sepsis ruled in or ruled out and clinical indicators for Severe Sepsis if rule din:  A) Severe Sepsis ruled in and present on admission ( Document Clinical Indicators)   B)  Severe Sepsis ruled out  C) Unable to determine  D) other ( Please specify condition)

## 2020-10-30 ENCOUNTER — OUTPATIENT (OUTPATIENT)
Dept: OUTPATIENT SERVICES | Facility: HOSPITAL | Age: 60
LOS: 1 days | End: 2020-10-30
Payer: COMMERCIAL

## 2020-10-30 VITALS
HEART RATE: 93 BPM | HEIGHT: 64 IN | DIASTOLIC BLOOD PRESSURE: 86 MMHG | TEMPERATURE: 98 F | RESPIRATION RATE: 20 BRPM | WEIGHT: 177.69 LBS | SYSTOLIC BLOOD PRESSURE: 128 MMHG

## 2020-10-30 DIAGNOSIS — Z98.890 OTHER SPECIFIED POSTPROCEDURAL STATES: Chronic | ICD-10-CM

## 2020-10-30 DIAGNOSIS — E03.9 HYPOTHYROIDISM, UNSPECIFIED: ICD-10-CM

## 2020-10-30 DIAGNOSIS — Z29.9 ENCOUNTER FOR PROPHYLACTIC MEASURES, UNSPECIFIED: ICD-10-CM

## 2020-10-30 DIAGNOSIS — Z96.0 PRESENCE OF UROGENITAL IMPLANTS: Chronic | ICD-10-CM

## 2020-10-30 DIAGNOSIS — N81.2 INCOMPLETE UTEROVAGINAL PROLAPSE: ICD-10-CM

## 2020-10-30 DIAGNOSIS — Z01.818 ENCOUNTER FOR OTHER PREPROCEDURAL EXAMINATION: ICD-10-CM

## 2020-10-30 LAB
A1C WITH ESTIMATED AVERAGE GLUCOSE RESULT: 5.2 % — SIGNIFICANT CHANGE UP (ref 4–5.6)
ANION GAP SERPL CALC-SCNC: 12 MMOL/L — SIGNIFICANT CHANGE UP (ref 5–17)
APPEARANCE UR: CLEAR — SIGNIFICANT CHANGE UP
APTT BLD: 30.2 SEC — SIGNIFICANT CHANGE UP (ref 27.5–35.5)
BACTERIA # UR AUTO: ABNORMAL
BASOPHILS # BLD AUTO: 0.04 K/UL — SIGNIFICANT CHANGE UP (ref 0–0.2)
BASOPHILS NFR BLD AUTO: 0.6 % — SIGNIFICANT CHANGE UP (ref 0–2)
BILIRUB UR-MCNC: NEGATIVE — SIGNIFICANT CHANGE UP
BLD GP AB SCN SERPL QL: SIGNIFICANT CHANGE UP
BUN SERPL-MCNC: 14 MG/DL — SIGNIFICANT CHANGE UP (ref 8–20)
CALCIUM SERPL-MCNC: 9.9 MG/DL — SIGNIFICANT CHANGE UP (ref 8.6–10.2)
CHLORIDE SERPL-SCNC: 103 MMOL/L — SIGNIFICANT CHANGE UP (ref 98–107)
CO2 SERPL-SCNC: 28 MMOL/L — SIGNIFICANT CHANGE UP (ref 22–29)
COLOR SPEC: YELLOW — SIGNIFICANT CHANGE UP
CREAT SERPL-MCNC: 0.66 MG/DL — SIGNIFICANT CHANGE UP (ref 0.5–1.3)
DIFF PNL FLD: ABNORMAL
EOSINOPHIL # BLD AUTO: 0.06 K/UL — SIGNIFICANT CHANGE UP (ref 0–0.5)
EOSINOPHIL NFR BLD AUTO: 0.9 % — SIGNIFICANT CHANGE UP (ref 0–6)
EPI CELLS # UR: SIGNIFICANT CHANGE UP
ESTIMATED AVERAGE GLUCOSE: 103 MG/DL — SIGNIFICANT CHANGE UP (ref 68–114)
GLUCOSE SERPL-MCNC: 100 MG/DL — HIGH (ref 70–99)
GLUCOSE UR QL: NEGATIVE MG/DL — SIGNIFICANT CHANGE UP
HCT VFR BLD CALC: 43.6 % — SIGNIFICANT CHANGE UP (ref 34.5–45)
HGB BLD-MCNC: 14 G/DL — SIGNIFICANT CHANGE UP (ref 11.5–15.5)
IMM GRANULOCYTES NFR BLD AUTO: 0.3 % — SIGNIFICANT CHANGE UP (ref 0–1.5)
INR BLD: 1.08 RATIO — SIGNIFICANT CHANGE UP (ref 0.88–1.16)
KETONES UR-MCNC: NEGATIVE — SIGNIFICANT CHANGE UP
LEUKOCYTE ESTERASE UR-ACNC: NEGATIVE — SIGNIFICANT CHANGE UP
LYMPHOCYTES # BLD AUTO: 1.6 K/UL — SIGNIFICANT CHANGE UP (ref 1–3.3)
LYMPHOCYTES # BLD AUTO: 24 % — SIGNIFICANT CHANGE UP (ref 13–44)
MCHC RBC-ENTMCNC: 29 PG — SIGNIFICANT CHANGE UP (ref 27–34)
MCHC RBC-ENTMCNC: 32.1 GM/DL — SIGNIFICANT CHANGE UP (ref 32–36)
MCV RBC AUTO: 90.5 FL — SIGNIFICANT CHANGE UP (ref 80–100)
MONOCYTES # BLD AUTO: 0.36 K/UL — SIGNIFICANT CHANGE UP (ref 0–0.9)
MONOCYTES NFR BLD AUTO: 5.4 % — SIGNIFICANT CHANGE UP (ref 2–14)
NEUTROPHILS # BLD AUTO: 4.59 K/UL — SIGNIFICANT CHANGE UP (ref 1.8–7.4)
NEUTROPHILS NFR BLD AUTO: 68.8 % — SIGNIFICANT CHANGE UP (ref 43–77)
NITRITE UR-MCNC: NEGATIVE — SIGNIFICANT CHANGE UP
PH UR: 6.5 — SIGNIFICANT CHANGE UP (ref 5–8)
PLATELET # BLD AUTO: 313 K/UL — SIGNIFICANT CHANGE UP (ref 150–400)
POTASSIUM SERPL-MCNC: 5.2 MMOL/L — SIGNIFICANT CHANGE UP (ref 3.5–5.3)
POTASSIUM SERPL-SCNC: 5.2 MMOL/L — SIGNIFICANT CHANGE UP (ref 3.5–5.3)
PROT UR-MCNC: NEGATIVE MG/DL — SIGNIFICANT CHANGE UP
PROTHROM AB SERPL-ACNC: 12.5 SEC — SIGNIFICANT CHANGE UP (ref 10.6–13.6)
RBC # BLD: 4.82 M/UL — SIGNIFICANT CHANGE UP (ref 3.8–5.2)
RBC # FLD: 14.1 % — SIGNIFICANT CHANGE UP (ref 10.3–14.5)
RBC CASTS # UR COMP ASSIST: SIGNIFICANT CHANGE UP /HPF (ref 0–4)
SODIUM SERPL-SCNC: 142 MMOL/L — SIGNIFICANT CHANGE UP (ref 135–145)
SP GR SPEC: 1 — LOW (ref 1.01–1.02)
UROBILINOGEN FLD QL: NEGATIVE MG/DL — SIGNIFICANT CHANGE UP
WBC # BLD: 6.67 K/UL — SIGNIFICANT CHANGE UP (ref 3.8–10.5)
WBC # FLD AUTO: 6.67 K/UL — SIGNIFICANT CHANGE UP (ref 3.8–10.5)
WBC UR QL: SIGNIFICANT CHANGE UP

## 2020-10-30 PROCEDURE — 93010 ELECTROCARDIOGRAM REPORT: CPT

## 2020-10-30 PROCEDURE — 93005 ELECTROCARDIOGRAM TRACING: CPT

## 2020-10-30 PROCEDURE — G0463: CPT

## 2020-10-30 RX ORDER — SODIUM CHLORIDE 9 MG/ML
3 INJECTION INTRAMUSCULAR; INTRAVENOUS; SUBCUTANEOUS EVERY 8 HOURS
Refills: 0 | Status: DISCONTINUED | OUTPATIENT
Start: 2020-11-12 | End: 2020-11-13

## 2020-10-30 NOTE — H&P PST ADULT - ASSESSMENT
61 y/o female , Post menopausal, hypothyroid, GERD,  stress incontinence, incomplete uterovaginal prolapse, seen today pre-op for Robotic supracervical hysterectomy with bilateral salpingectomy, robotic sacrocolpopexy possible anterior and posterior repair suburethral sling, cystoscopy. Pt report uterovaginal prolapse more than 16 years ago that has progressively gotten worse within the past one year. Report urine frequency, urgency, urosepsis x2  for the past one month and calculus of ureter. Pt underwent right ureteric stent 2020 and removal of stent after lithotripsy.  Pt report current pessary situ with poor efficacy. Pt denies hematuria, dysuria, chills and fever. Seen today for a scheduled surgery with Laverne Maguire. Surgery protocol reviewed with today, Pt to follow-up with PCP office for medical clearance.   CAPRINI VTE 2.0 SCORE [CLOT updated 2019]    AGE RELATED RISK FACTORS                                                       MOBILITY RELATED FACTORS  [ ] Age 41-60 years                                            (1 Point)                    [ ] Bed rest                                                        (1 Point)  [ x] Age: 61-74 years                                           (2 Points)                  [ ] Plaster cast                                                   (2 Points)  [ ] Age= 75 years                                              (3 Points)                    [ ] Bed bound for more than 72 hours                 (2 Points)    DISEASE RELATED RISK FACTORS                                               GENDER SPECIFIC FACTORS  [ ] Edema in the lower extremities                       (1 Point)              [ ] Pregnancy                                                     (1 Point)  [ ] Varicose veins                                               (1 Point)                     [ ] Post-partum < 6 weeks                                   (1 Point)             [x ] BMI > 25 Kg/m2                                            (1 Point)                     [ ] Hormonal therapy  or oral contraception          (1 Point)                 [ ] Sepsis (in the previous month)                        (1 Point)               [ ] History of pregnancy complications                 (1 point)  [ ] Pneumonia or serious lung disease                                               [ ] Unexplained or recurrent                     (1 Point)           (in the previous month)                               (1 Point)  [ ] Abnormal pulmonary function test                     (1 Point)                 SURGERY RELATED RISK FACTORS  [ ] Acute myocardial infarction                              (1 Point)               [ ]  Section                                             (1 Point)  [ ] Congestive heart failure (in the previous month)  (1 Point)      [ ] Minor surgery                                                  (1 Point)   [ ] Inflammatory bowel disease                             (1 Point)               [ ] Arthroscopic surgery                                        (2 Points)  [ ] Central venous access                                      (2 Points)                [ x] General surgery lasting more than 45 minutes (2 points)  [ ] Malignancy- Present or previous                   (2 Points)                [ ] Elective arthroplasty                                         (5 points)    [ ] Stroke (in the previous month)                          (5 Points)                                                                                                                                                           HEMATOLOGY RELATED FACTORS                                                 TRAUMA RELATED RISK FACTORS  [ ] Prior episodes of VTE                                     (3 Points)                [ ] Fracture of the hip, pelvis, or leg                       (5 Points)  [ ] Positive family history for VTE                         (3 Points)             [ ] Acute spinal cord injury (in the previous month)  (5 Points)  [ ] Prothrombin 04784 A                                     (3 Points)               [ ] Paralysis  (less than 1 month)                             (5 Points)  [ ] Factor V Leiden                                             (3 Points)                  [ ] Multiple Trauma within 1 month                        (5 Points)  [ ] Lupus anticoagulants                                     (3 Points)                                                           [ ] Anticardiolipin antibodies                               (3 Points)                                                       [ ] High homocysteine in the blood                      (3 Points)                                             [ ] Other congenital or acquired thrombophilia      (3 Points)                                                [ ] Heparin induced thrombocytopenia                  (3 Points)                                     Total Score [    5      ]  OPIOID RISK TOOL    BERNABE EACH BOX THAT APPLIES AND ADD TOTALS AT THE END    FAMILY HISTORY OF SUBSTANCE ABUSE                 FEMALE         MALE                                                Alcohol                             [  ]1 pt          [  ]3pts                                               Illegal Durgs                     [  ]2 pts        [  ]3pts                                               Rx Drugs                           [  ]4 pts        [  ]4 pts    PERSONAL HISTORY OF SUBSTANCE ABUSE                                                                                          Alcohol                             [  ]3 pts       [  ]3 pts                                               Illegal Drugs                     [  ]4 pts        [  ]4 pts                                               Rx Drugs                           [  ]5 pts        [  ]5 pts    AGE BETWEEN 16-45 YEARS                                      [  ]1 pt         [  ]1 pt    HISTORY OF PREADOLESCENT   SEXUAL ABUSE                                                             [  ]3 pts        [  ]0pts    PSYCHOLOGICAL DISEASE                     ADD, OCD, Bipolar, Schizophrenia        [  ]2 pts         [  ]2 pts                      Depression                                               [  ]1 pt           [  ]1 pt           SCORING TOTAL   (add numbers and type here)              (**0*)                                     A score of 3 or lower indicated LOW risk for future opioid abuse  A score of 4 to 7 indicated moderate risk for future opioid abuse  A score of 8 or higher indicates a high risk for opioid abuse

## 2020-10-30 NOTE — H&P PST ADULT - NSICDXPASTMEDICALHX_GEN_ALL_CORE_FT
PAST MEDICAL HISTORY:  Barretts syndrome pre barrets synfrome    Calcium ureterolithiasis     Cystocele with prolapse     GERD (gastroesophageal reflux disease)     Hemorrhoids     Hypothyroidism     Prolapse of female pelvic organs     Urinary incontinence in female     Uterine prolapse

## 2020-10-30 NOTE — H&P PST ADULT - NSICDXFAMILYHX_GEN_ALL_CORE_FT
FAMILY HISTORY:  FH: diabetes mellitus, maternal uncle, brother    Father  Still living? Unknown  FH: coronary artery disease, Age at diagnosis: Age Unknown    Mother  Still living? Unknown  FH: coronary artery disease, Age at diagnosis: Age Unknown

## 2020-10-30 NOTE — H&P PST ADULT - NSICDXPROBLEM_GEN_ALL_CORE_FT
PROBLEM DIAGNOSES  Problem: Uterovaginal prolapse, incomplete  Assessment and Plan: Robotic supracervical hysterectomy with bilateral salpingectomy, robotic sacrocolpopexy, possible anterior and posterior repair, Sub urethral sling, cystoscopy. F/u with PCP for medical clearance     Problem: Hypothyroidism  Assessment and Plan: Pt to continue current treatment regimen as per PCP order     Problem: Need for prophylactic measure  Assessment and Plan: Moderate risk surgical team to dtermine prophylactic intervention

## 2020-10-30 NOTE — H&P PST ADULT - AIRWAY
Department of Anesthesiology  Postprocedure Note    Patient: Merry Olson  MRN: [id number]  YOB: 1979  Date of evaluation: 8/12/2020  Time:  9:10 AM     Procedure Summary     Date:  08/11/20 Room / Location:  [address]    Anesthesia Start:  0277 Anesthesia Stop:  5365    Procedure:  APPENDECTOMY LAPAROSCOPIC (N/A Abdomen) Diagnosis:  (acute appendicitis)    Surgeon:  Yolanda Olivares MD Responsible Provider:  JOSEFA Wadsworth CRNA    Anesthesia Type:  general ASA Status:  1          Anesthesia Type: general    Lorene Phase I: Lorene Score: 8    Lorene Phase II:      Last vitals: Reviewed and per EMR flowsheets.        Anesthesia Post Evaluation    Patient location during evaluation: PACU  Patient participation: complete - patient participated  Level of consciousness: awake and alert  Pain score: 1  Airway patency: patent  Nausea & Vomiting: no nausea and no vomiting  Complications: no  Cardiovascular status: blood pressure returned to baseline  Respiratory status: acceptable  Hydration status: euvolemic
normal

## 2020-10-30 NOTE — H&P PST ADULT - HISTORY OF PRESENT ILLNESS
61 y/o female , Post menopausal, hypothyroid, stress incontinence, incomplete uterovaginal prolapse, seen today pre-op for Robotic supracervical hysterectomy with bilateral salpingectomy, robotic sacrocolpopexy possible anterior and posterior repair suburethral sling, cystoscopy. 59 y/o female , Post menopausal, hypothyroid, GERD,  stress incontinence, incomplete uterovaginal prolapse, seen today pre-op for Robotic supracervical hysterectomy with bilateral salpingectomy, robotic sacrocolpopexy possible anterior and posterior repair suburethral sling, cystoscopy. Pt report uterovaginal prolapse more than 16 years ago that has progressively gotten worse within the past one year. Report urine frequency, urgency, urosepsis x2  for the past one month and calculus of ureter. Pt underwent right ureteric stent 2020 and removal of stent after lithotripsy.  Pt report current pessary situ with poor efficacy. Pt denies hematuria, dysuria, chills and fever. Seen today for a scheduled surgery with Laverne Maguire.

## 2020-10-30 NOTE — PATIENT PROFILE ADULT - NSPROGENSOURCEINFO_GEN_A_NUR
patient patient/Pt [place] and/or internationally in the last 14-21 days.

## 2020-10-30 NOTE — PATIENT PROFILE ADULT - NSPROHMSYMPCOND_GEN_A_NUR
NP, instructed pt on pre-op instructions/teaching, tips for safer surgery, pain management scale, pre-surgical infection prevention instructions, Covid swab appt (11/9), influenza vaccine info sheet: risks/benefits given. Pt verbalized understanding of all instructions given.

## 2020-11-01 LAB
CULTURE RESULTS: SIGNIFICANT CHANGE UP
SPECIMEN SOURCE: SIGNIFICANT CHANGE UP

## 2020-11-06 PROBLEM — K22.70 BARRETT'S ESOPHAGUS WITHOUT DYSPLASIA: Chronic | Status: ACTIVE | Noted: 2020-09-22

## 2020-11-08 DIAGNOSIS — Z01.818 ENCOUNTER FOR OTHER PREPROCEDURAL EXAMINATION: ICD-10-CM

## 2020-11-09 ENCOUNTER — APPOINTMENT (OUTPATIENT)
Dept: DISASTER EMERGENCY | Facility: CLINIC | Age: 60
End: 2020-11-09

## 2020-11-10 LAB — SARS-COV-2 N GENE NPH QL NAA+PROBE: NOT DETECTED

## 2020-11-11 ENCOUNTER — TRANSCRIPTION ENCOUNTER (OUTPATIENT)
Age: 60
End: 2020-11-11

## 2020-11-12 ENCOUNTER — RESULT REVIEW (OUTPATIENT)
Age: 60
End: 2020-11-12

## 2020-11-12 ENCOUNTER — INPATIENT (INPATIENT)
Facility: HOSPITAL | Age: 60
LOS: 0 days | Discharge: HOME | DRG: 743 | End: 2020-11-13
Attending: OBSTETRICS & GYNECOLOGY | Admitting: OBSTETRICS & GYNECOLOGY
Payer: COMMERCIAL

## 2020-11-12 ENCOUNTER — APPOINTMENT (OUTPATIENT)
Dept: UROGYNECOLOGY | Facility: HOSPITAL | Age: 60
End: 2020-11-12
Payer: COMMERCIAL

## 2020-11-12 VITALS
HEART RATE: 94 BPM | WEIGHT: 177.69 LBS | OXYGEN SATURATION: 97 % | SYSTOLIC BLOOD PRESSURE: 139 MMHG | TEMPERATURE: 99 F | HEIGHT: 64 IN | RESPIRATION RATE: 16 BRPM | DIASTOLIC BLOOD PRESSURE: 75 MMHG

## 2020-11-12 DIAGNOSIS — Z98.890 OTHER SPECIFIED POSTPROCEDURAL STATES: Chronic | ICD-10-CM

## 2020-11-12 DIAGNOSIS — Z96.0 PRESENCE OF UROGENITAL IMPLANTS: Chronic | ICD-10-CM

## 2020-11-12 DIAGNOSIS — N81.2 INCOMPLETE UTEROVAGINAL PROLAPSE: ICD-10-CM

## 2020-11-12 LAB — BLD GP AB SCN SERPL QL: SIGNIFICANT CHANGE UP

## 2020-11-12 PROCEDURE — 88305 TISSUE EXAM BY PATHOLOGIST: CPT | Mod: 26

## 2020-11-12 PROCEDURE — 57288 REPAIR BLADDER DEFECT: CPT | Mod: AS

## 2020-11-12 PROCEDURE — 58542 LSH W/T/O UT 250 G OR LESS: CPT | Mod: AS

## 2020-11-12 PROCEDURE — 58542 LSH W/T/O UT 250 G OR LESS: CPT

## 2020-11-12 PROCEDURE — 57425 LAPAROSCOPY SURG COLPOPEXY: CPT

## 2020-11-12 PROCEDURE — 57288 REPAIR BLADDER DEFECT: CPT

## 2020-11-12 PROCEDURE — 57425 LAPAROSCOPY SURG COLPOPEXY: CPT | Mod: AS

## 2020-11-12 RX ORDER — SODIUM CHLORIDE 9 MG/ML
1000 INJECTION, SOLUTION INTRAVENOUS
Refills: 0 | Status: DISCONTINUED | OUTPATIENT
Start: 2020-11-12 | End: 2020-11-13

## 2020-11-12 RX ORDER — ONDANSETRON 8 MG/1
4 TABLET, FILM COATED ORAL EVERY 6 HOURS
Refills: 0 | Status: DISCONTINUED | OUTPATIENT
Start: 2020-11-12 | End: 2020-11-13

## 2020-11-12 RX ORDER — LEVOTHYROXINE SODIUM 125 MCG
100 TABLET ORAL DAILY
Refills: 0 | Status: DISCONTINUED | OUTPATIENT
Start: 2020-11-13 | End: 2020-11-13

## 2020-11-12 RX ORDER — OXYCODONE AND ACETAMINOPHEN 5; 325 MG/1; MG/1
1 TABLET ORAL
Qty: 12 | Refills: 0
Start: 2020-11-12 | End: 2020-11-14

## 2020-11-12 RX ORDER — OXYCODONE AND ACETAMINOPHEN 5; 325 MG/1; MG/1
2 TABLET ORAL EVERY 6 HOURS
Refills: 0 | Status: DISCONTINUED | OUTPATIENT
Start: 2020-11-12 | End: 2020-11-13

## 2020-11-12 RX ORDER — OXYCODONE AND ACETAMINOPHEN 5; 325 MG/1; MG/1
1 TABLET ORAL EVERY 4 HOURS
Refills: 0 | Status: DISCONTINUED | OUTPATIENT
Start: 2020-11-12 | End: 2020-11-13

## 2020-11-12 RX ORDER — FENTANYL CITRATE 50 UG/ML
25 INJECTION INTRAVENOUS
Refills: 0 | Status: DISCONTINUED | OUTPATIENT
Start: 2020-11-12 | End: 2020-11-12

## 2020-11-12 RX ORDER — CEFAZOLIN SODIUM 1 G
2000 VIAL (EA) INJECTION ONCE
Refills: 0 | Status: COMPLETED | OUTPATIENT
Start: 2020-11-12 | End: 2020-11-12

## 2020-11-12 RX ORDER — CEFAZOLIN SODIUM 1 G
1000 VIAL (EA) INJECTION EVERY 8 HOURS
Refills: 0 | Status: COMPLETED | OUTPATIENT
Start: 2020-11-12 | End: 2020-11-13

## 2020-11-12 RX ORDER — SODIUM CHLORIDE 9 MG/ML
1000 INJECTION, SOLUTION INTRAVENOUS
Refills: 0 | Status: DISCONTINUED | OUTPATIENT
Start: 2020-11-12 | End: 2020-11-12

## 2020-11-12 RX ORDER — ONDANSETRON 8 MG/1
4 TABLET, FILM COATED ORAL ONCE
Refills: 0 | Status: DISCONTINUED | OUTPATIENT
Start: 2020-11-12 | End: 2020-11-12

## 2020-11-12 RX ORDER — KETOROLAC TROMETHAMINE 30 MG/ML
30 SYRINGE (ML) INJECTION EVERY 8 HOURS
Refills: 0 | Status: DISCONTINUED | OUTPATIENT
Start: 2020-11-12 | End: 2020-11-13

## 2020-11-12 RX ORDER — ENOXAPARIN SODIUM 100 MG/ML
40 INJECTION SUBCUTANEOUS DAILY
Refills: 0 | Status: DISCONTINUED | OUTPATIENT
Start: 2020-11-12 | End: 2020-11-13

## 2020-11-12 RX ORDER — POLYETHYLENE GLYCOL 3350 17 G/17G
17 POWDER, FOR SOLUTION ORAL AT BEDTIME
Refills: 0 | Status: DISCONTINUED | OUTPATIENT
Start: 2020-11-12 | End: 2020-11-13

## 2020-11-12 RX ADMIN — SODIUM CHLORIDE 100 MILLILITER(S): 9 INJECTION, SOLUTION INTRAVENOUS at 23:37

## 2020-11-12 RX ADMIN — Medication 100 MILLIGRAM(S): at 18:44

## 2020-11-12 RX ADMIN — POLYETHYLENE GLYCOL 3350 17 GRAM(S): 17 POWDER, FOR SOLUTION ORAL at 21:45

## 2020-11-12 RX ADMIN — Medication 100 MILLIGRAM(S): at 11:05

## 2020-11-12 RX ADMIN — FENTANYL CITRATE 25 MICROGRAM(S): 50 INJECTION INTRAVENOUS at 14:25

## 2020-11-12 RX ADMIN — FENTANYL CITRATE 25 MICROGRAM(S): 50 INJECTION INTRAVENOUS at 14:30

## 2020-11-12 RX ADMIN — ENOXAPARIN SODIUM 40 MILLIGRAM(S): 100 INJECTION SUBCUTANEOUS at 23:36

## 2020-11-12 NOTE — BRIEF OPERATIVE NOTE - OPERATION/FINDINGS
cystoscopy with normal findings, ureteral jets visualized bilaterally, no masses, perforation, bleeding, or abnormal anatomy seen   grossly normal uterus, fallopian tubes, and ovaries

## 2020-11-12 NOTE — PROGRESS NOTE ADULT - SUBJECTIVE AND OBJECTIVE BOX
59 y/o now s/p robotic assisted supracervical hysterectomy with bilateral salpingectomy and sacrocolpopexy with sling and cystoscopy for mixed stress and urgency incontinence and vaginal prolapse, uncomplicated intraoperative course, HD#1.     S:  Patient was seen and examined at bedside, reports feeling well.   Pain is well controlled on PRN medications.   Tolerating ice chips, denies N/V.   Carmichael in place, not yet ambulatory.   Denies flatus or BM.  Denies dizziness, lightheadedness, SOB, palpitations, or fatigue at rest.     O:   T(C): 36.7 (11-12-20 @ 16:00), Max: 37.1 (11-12-20 @ 09:33)  HR: 79 (11-12-20 @ 16:00) (60 - 94)  BP: 117/59 (11-12-20 @ 16:00) (93/56 - 139/75)  RR: 18 (11-12-20 @ 16:00) (14 - 23)  SpO2: 98% (11-12-20 @ 16:00) (97% - 100%)    Gen: NAD   Lungs: CTAB   CV: RRR  Abdomen: soft, appropriately tender to palpation, + BS, incision sites clean/dry/intact  Ext: nontender lower extremities bilaterally   Pelvic: Carmichael in place, no active bleeding           61 y/o now s/p robotic assisted supracervical hysterectomy with bilateral salpingectomy and sacrocolpopexy with sling and cystoscopy for mixed stress and urgency incontinence and vaginal prolapse, uncomplicated intraoperative course, HD#1.     S:  Patient was seen and examined at bedside, reports feeling well.   Pain is well controlled on PRN medications.   Tolerating ice chips, denies N/V.   Carmichael in place, not yet ambulatory.   Denies flatus or BM.  Denies dizziness, lightheadedness, SOB, palpitations, or fatigue at rest.     O:   T(C): 36.7 (11-12-20 @ 16:00), Max: 37.1 (11-12-20 @ 09:33)  HR: 79 (11-12-20 @ 16:00) (60 - 94)  BP: 117/59 (11-12-20 @ 16:00) (93/56 - 139/75)  RR: 18 (11-12-20 @ 16:00) (14 - 23)  SpO2: 98% (11-12-20 @ 16:00) (97% - 100%)    Gen: NAD   Lungs: CTAB   CV: RRR  Abdomen: soft, appropriately tender to palpation, + BS, incision sites clean/dry/intact  Ext: nontender lower extremities bilaterally   Pelvic: Carmichael in place, no active bleeding

## 2020-11-12 NOTE — PROGRESS NOTE ADULT - ASSESSMENT
59 y/o now s/p robotic assisted supracervical hysterectomy with bilateral salpingectomy and sacrocolpopexy with sling and cystoscopy for mixed stress and urgency incontinence and vaginal prolapse, uncomplicated intraoperative course, HD#1.    -in stable condition   -Continue post operative care  -Labs: CBC and BMP in AM  -: Carmichael in place, active trial of void in AM   -GI: tolerating ice chips, asymptomatic, advance as tolerated   -DVT ppx: SCDs and Lovenox

## 2020-11-12 NOTE — BRIEF OPERATIVE NOTE - NSICDXBRIEFPOSTOP_GEN_ALL_CORE_FT
POST-OP DIAGNOSIS:  Urinary, incontinence, stress female 12-Nov-2020 13:18:21  Alex Weir  Incomplete uterovaginal prolapse 12-Nov-2020 13:18:13  Alex Weir

## 2020-11-12 NOTE — BRIEF OPERATIVE NOTE - NSICDXBRIEFPROCEDURE_GEN_ALL_CORE_FT
PROCEDURES:  Creation of mid urethral sling in female 12-Nov-2020 13:17:20  Alex Weir  Robot-assisted bilateral salpingectomy 12-Nov-2020 13:16:33  Alex Weir  Hysterectomy, supracervical, robot-assisted, with sacrocolpopexy, with cystoscopy 12-Nov-2020 13:16:04  Alex Weir

## 2020-11-12 NOTE — BRIEF OPERATIVE NOTE - NSICDXBRIEFPREOP_GEN_ALL_CORE_FT
PRE-OP DIAGNOSIS:  Urinary, incontinence, stress female 12-Nov-2020 13:17:58  Alex Weir  Incomplete uterovaginal prolapse 12-Nov-2020 13:17:45  Alex Weir

## 2020-11-13 ENCOUNTER — TRANSCRIPTION ENCOUNTER (OUTPATIENT)
Age: 60
End: 2020-11-13

## 2020-11-13 VITALS
TEMPERATURE: 98 F | HEART RATE: 77 BPM | RESPIRATION RATE: 16 BRPM | DIASTOLIC BLOOD PRESSURE: 68 MMHG | SYSTOLIC BLOOD PRESSURE: 108 MMHG | OXYGEN SATURATION: 97 %

## 2020-11-13 LAB
ANION GAP SERPL CALC-SCNC: 11 MMOL/L — SIGNIFICANT CHANGE UP (ref 5–17)
BASOPHILS # BLD AUTO: 0.01 K/UL — SIGNIFICANT CHANGE UP (ref 0–0.2)
BASOPHILS NFR BLD AUTO: 0.1 % — SIGNIFICANT CHANGE UP (ref 0–2)
BUN SERPL-MCNC: 16 MG/DL — SIGNIFICANT CHANGE UP (ref 8–20)
CALCIUM SERPL-MCNC: 8.9 MG/DL — SIGNIFICANT CHANGE UP (ref 8.6–10.2)
CHLORIDE SERPL-SCNC: 105 MMOL/L — SIGNIFICANT CHANGE UP (ref 98–107)
CO2 SERPL-SCNC: 23 MMOL/L — SIGNIFICANT CHANGE UP (ref 22–29)
CREAT SERPL-MCNC: 0.62 MG/DL — SIGNIFICANT CHANGE UP (ref 0.5–1.3)
EOSINOPHIL # BLD AUTO: 0 K/UL — SIGNIFICANT CHANGE UP (ref 0–0.5)
EOSINOPHIL NFR BLD AUTO: 0 % — SIGNIFICANT CHANGE UP (ref 0–6)
GLUCOSE SERPL-MCNC: 101 MG/DL — HIGH (ref 70–99)
HCT VFR BLD CALC: 34.5 % — SIGNIFICANT CHANGE UP (ref 34.5–45)
HGB BLD-MCNC: 11.2 G/DL — LOW (ref 11.5–15.5)
IMM GRANULOCYTES NFR BLD AUTO: 0.3 % — SIGNIFICANT CHANGE UP (ref 0–1.5)
LYMPHOCYTES # BLD AUTO: 1.03 K/UL — SIGNIFICANT CHANGE UP (ref 1–3.3)
LYMPHOCYTES # BLD AUTO: 13 % — SIGNIFICANT CHANGE UP (ref 13–44)
MCHC RBC-ENTMCNC: 28.6 PG — SIGNIFICANT CHANGE UP (ref 27–34)
MCHC RBC-ENTMCNC: 32.5 GM/DL — SIGNIFICANT CHANGE UP (ref 32–36)
MCV RBC AUTO: 88 FL — SIGNIFICANT CHANGE UP (ref 80–100)
MONOCYTES # BLD AUTO: 0.49 K/UL — SIGNIFICANT CHANGE UP (ref 0–0.9)
MONOCYTES NFR BLD AUTO: 6.2 % — SIGNIFICANT CHANGE UP (ref 2–14)
NEUTROPHILS # BLD AUTO: 6.39 K/UL — SIGNIFICANT CHANGE UP (ref 1.8–7.4)
NEUTROPHILS NFR BLD AUTO: 80.4 % — HIGH (ref 43–77)
PLATELET # BLD AUTO: 255 K/UL — SIGNIFICANT CHANGE UP (ref 150–400)
POTASSIUM SERPL-MCNC: 4 MMOL/L — SIGNIFICANT CHANGE UP (ref 3.5–5.3)
POTASSIUM SERPL-SCNC: 4 MMOL/L — SIGNIFICANT CHANGE UP (ref 3.5–5.3)
RBC # BLD: 3.92 M/UL — SIGNIFICANT CHANGE UP (ref 3.8–5.2)
RBC # FLD: 14.1 % — SIGNIFICANT CHANGE UP (ref 10.3–14.5)
SODIUM SERPL-SCNC: 139 MMOL/L — SIGNIFICANT CHANGE UP (ref 135–145)
WBC # BLD: 7.94 K/UL — SIGNIFICANT CHANGE UP (ref 3.8–10.5)
WBC # FLD AUTO: 7.94 K/UL — SIGNIFICANT CHANGE UP (ref 3.8–10.5)

## 2020-11-13 PROCEDURE — 82962 GLUCOSE BLOOD TEST: CPT

## 2020-11-13 PROCEDURE — 36415 COLL VENOUS BLD VENIPUNCTURE: CPT

## 2020-11-13 PROCEDURE — 86850 RBC ANTIBODY SCREEN: CPT

## 2020-11-13 PROCEDURE — 80048 BASIC METABOLIC PNL TOTAL CA: CPT

## 2020-11-13 PROCEDURE — C1889: CPT

## 2020-11-13 PROCEDURE — S2900: CPT

## 2020-11-13 PROCEDURE — 86901 BLOOD TYPING SEROLOGIC RH(D): CPT

## 2020-11-13 PROCEDURE — 86900 BLOOD TYPING SEROLOGIC ABO: CPT

## 2020-11-13 PROCEDURE — 85025 COMPLETE CBC W/AUTO DIFF WBC: CPT

## 2020-11-13 PROCEDURE — C1781: CPT

## 2020-11-13 PROCEDURE — 88305 TISSUE EXAM BY PATHOLOGIST: CPT

## 2020-11-13 RX ORDER — CHOLECALCIFEROL (VITAMIN D3) 125 MCG
2 CAPSULE ORAL
Qty: 0 | Refills: 0 | DISCHARGE

## 2020-11-13 RX ORDER — LEVOTHYROXINE SODIUM 125 MCG
1 TABLET ORAL
Qty: 0 | Refills: 0 | DISCHARGE

## 2020-11-13 RX ADMIN — Medication 100 MICROGRAM(S): at 06:20

## 2020-11-13 RX ADMIN — Medication 100 MILLIGRAM(S): at 02:45

## 2020-11-13 RX ADMIN — Medication 30 MILLIGRAM(S): at 02:45

## 2020-11-13 RX ADMIN — Medication 30 MILLIGRAM(S): at 03:00

## 2020-11-13 NOTE — PROGRESS NOTE ADULT - SUBJECTIVE AND OBJECTIVE BOX
59 y/o now POD #1 s/p robotic assisted supracervical hysterectomy with bilateral salpingectomy and sacrocolpopexy with sling and cystoscopy for mixed stress and urgency incontinence and vaginal prolapse, uncomplicated intraoperative course, HD#2.     S:  Patient was seen and examined at bedside, reports feeling well.   Pain is well controlled on PRN medications.   Tolerating regular diet, denies N/V. +flatus -BM  Voiding spontaneously. Ambulating without difficulty.   Denies dizziness, lightheadedness, SOB, palpitations, or fatigue at rest or with ambulation.   Denies fevers, chills, malaise, fatigue, and myalgia.     O:   T(C): 36.7 (11-12-20 @ 16:00), Max: 37.1 (11-12-20 @ 09:33)  HR: 79 (11-12-20 @ 16:00) (60 - 94)  BP: 117/59 (11-12-20 @ 16:00) (93/56 - 139/75)  RR: 18 (11-12-20 @ 16:00) (14 - 23)  SpO2: 98% (11-12-20 @ 16:00) (97% - 100%)    Gen: NAD   Lungs: CTAB   CV: RRR  Abdomen: soft, appropriately tender to palpation, + BS, incision sites clean/dry/intact  Ext: nontender lower extremities bilaterally   Pelvic: no active bleeding                          11.2   7.94  )-----------( 255      ( 13 Nov 2020 05:48 )             34.5

## 2020-11-13 NOTE — PROGRESS NOTE ADULT - ATTENDING COMMENTS
Pt seen and examined, agree with Dr. Weir's resident note today. Doing well, no CP/SOB/dizziness/N/V, voided subjectively completely, felt stream strong, when passed TOV. VSS, abd soft ND NT, incisions C/D/I dermabond. AM labs appropriate. Received Lovenox, has been OOB. DC home, precautions reviewed, home meds and PO pain meds. Had many questions, all answered, shower, nothing per vagina. All ques answered, f/u in 2 weeks.    SULMA Huynh MD  [phone] (cell)

## 2020-11-13 NOTE — DISCHARGE NOTE PROVIDER - NSDCFUADDINST_GEN_ALL_CORE_FT
May take over the counter Tylenol (500mg every 6hrs) and/or Motrin (600mg every 6hrs) for moderate pain.

## 2020-11-13 NOTE — DISCHARGE NOTE PROVIDER - CARE PROVIDER_API CALL
Trent Maguire  OBSTETRICS AND GYNECOLOGY  [address], [address]  [place] [address]  Phone: [phone]  Fax: [phone]  Follow Up Time:

## 2020-11-13 NOTE — DISCHARGE NOTE PROVIDER - HOSPITAL COURSE
61 y/o now POD #1 s/p robotic assisted supracervical hysterectomy with bilateral salpingectomy and sacrocolpopexy with sling and cystoscopy for mixed stress and urgency incontinence and vaginal prolapse, uncomplicated intraoperative course. Patient was subsequently transferred to floors where she had a successful recovery. At time of discharge patient was voiding spontaneously and without difficulty, passing flatus, tolerating regular diet, ambulating without difficulty, and pain was well controlled.

## 2020-11-13 NOTE — DISCHARGE NOTE PROVIDER - NSDCFUSCHEDAPPT_GEN_ALL_CORE_FT
FARHEEN SERNA ; 11/27/2020 ; NPP Rad  [facility] ; 11/27/2020 ; NPP Rad Diag 284 FARHEEN Pruitt ; 12/07/2020 ; NPP Urogyn [address] [facility]  FARHEEN SERNA ; 01/04/2021 ; NPP Urogyn [address] [facility]

## 2020-11-13 NOTE — DISCHARGE NOTE PROVIDER - NSDCCPTREATMENT_GEN_ALL_CORE_FT
PRINCIPAL PROCEDURE  Procedure: Hysterectomy, supracervical, robot-assisted, with sacrocolpopexy, with cystoscopy  Findings and Treatment:       SECONDARY PROCEDURE  Procedure: Creation of mid urethral sling in female  Findings and Treatment:

## 2020-11-13 NOTE — DISCHARGE NOTE PROVIDER - NSDCCPCAREPLAN_GEN_ALL_CORE_FT
PRINCIPAL DISCHARGE DIAGNOSIS  Diagnosis: Uterovaginal prolapse, incomplete  Assessment and Plan of Treatment:

## 2020-11-13 NOTE — DISCHARGE NOTE NURSING/CASE MANAGEMENT/SOCIAL WORK - PATIENT PORTAL LINK FT
You can access the FollowMyHealth Patient Portal offered by [facility] by registering at the following website: [email or web address]. By joining [facility]’s FollowMyHealth portal, you will also be able to view your health information using other applications (apps) compatible with our system.

## 2020-11-13 NOTE — DISCHARGE NOTE PROVIDER - NSDCFUADDAPPT_GEN_ALL_CORE_FT
Please call office to schedule follow up visit in 1-2 weeks.   Please call sooner if pain is unrelieved by medications, if bleeding worsens, or if fever.

## 2020-11-13 NOTE — PROGRESS NOTE ADULT - ASSESSMENT
59 y/o now POD#1 s/p robotic assisted supracervical hysterectomy with bilateral salpingectomy and sacrocolpopexy with sling and cystoscopy for mixed stress and urgency incontinence and vaginal prolapse, uncomplicated intraoperative course, HD#2.    -in stable condition   -Labs: AM labs within expected range   -: active trial of void successful (300cc in, 300cc out)   -GI: tolerating regular diet, +flatus   -DVT ppx: SCDs and Lovenox

## 2020-11-27 ENCOUNTER — OUTPATIENT (OUTPATIENT)
Dept: OUTPATIENT SERVICES | Facility: HOSPITAL | Age: 60
LOS: 1 days | End: 2020-11-27
Payer: COMMERCIAL

## 2020-11-27 ENCOUNTER — APPOINTMENT (OUTPATIENT)
Dept: ULTRASOUND IMAGING | Facility: CLINIC | Age: 60
End: 2020-11-27
Payer: COMMERCIAL

## 2020-11-27 ENCOUNTER — APPOINTMENT (OUTPATIENT)
Dept: RADIOLOGY | Facility: CLINIC | Age: 60
End: 2020-11-27
Payer: COMMERCIAL

## 2020-11-27 DIAGNOSIS — Z98.890 OTHER SPECIFIED POSTPROCEDURAL STATES: Chronic | ICD-10-CM

## 2020-11-27 DIAGNOSIS — Z96.0 PRESENCE OF UROGENITAL IMPLANTS: Chronic | ICD-10-CM

## 2020-11-27 DIAGNOSIS — Z00.8 ENCOUNTER FOR OTHER GENERAL EXAMINATION: ICD-10-CM

## 2020-11-27 PROCEDURE — 76770 US EXAM ABDO BACK WALL COMP: CPT

## 2020-11-27 PROCEDURE — 76770 US EXAM ABDO BACK WALL COMP: CPT | Mod: 26

## 2020-11-27 PROCEDURE — 74018 RADEX ABDOMEN 1 VIEW: CPT | Mod: 26

## 2020-11-27 PROCEDURE — 74018 RADEX ABDOMEN 1 VIEW: CPT

## 2020-12-04 ENCOUNTER — APPOINTMENT (OUTPATIENT)
Dept: CARDIOLOGY | Facility: CLINIC | Age: 60
End: 2020-12-04
Payer: COMMERCIAL

## 2020-12-04 ENCOUNTER — APPOINTMENT (OUTPATIENT)
Dept: UROGYNECOLOGY | Facility: CLINIC | Age: 60
End: 2020-12-04
Payer: COMMERCIAL

## 2020-12-04 ENCOUNTER — NON-APPOINTMENT (OUTPATIENT)
Age: 60
End: 2020-12-04

## 2020-12-04 VITALS
DIASTOLIC BLOOD PRESSURE: 85 MMHG | TEMPERATURE: 98 F | WEIGHT: 181 LBS | SYSTOLIC BLOOD PRESSURE: 133 MMHG | HEART RATE: 85 BPM | HEIGHT: 64 IN | OXYGEN SATURATION: 98 % | BODY MASS INDEX: 30.9 KG/M2

## 2020-12-04 VITALS — DIASTOLIC BLOOD PRESSURE: 74 MMHG | SYSTOLIC BLOOD PRESSURE: 132 MMHG

## 2020-12-04 DIAGNOSIS — R94.31 ABNORMAL ELECTROCARDIOGRAM [ECG] [EKG]: ICD-10-CM

## 2020-12-04 DIAGNOSIS — R00.2 PALPITATIONS: ICD-10-CM

## 2020-12-04 LAB
BILIRUB UR QL STRIP: NEGATIVE
CLARITY UR: CLEAR
COLLECTION METHOD: NORMAL
GLUCOSE UR-MCNC: NEGATIVE
HCG UR QL: 0.2 EU/DL
HGB UR QL STRIP.AUTO: NORMAL
KETONES UR-MCNC: NEGATIVE
LEUKOCYTE ESTERASE UR QL STRIP: NORMAL
NITRITE UR QL STRIP: NEGATIVE
PH UR STRIP: 5.5
PROT UR STRIP-MCNC: NEGATIVE
SP GR UR STRIP: 1.02

## 2020-12-04 PROCEDURE — 81003 URINALYSIS AUTO W/O SCOPE: CPT | Mod: QW

## 2020-12-04 PROCEDURE — 99244 OFF/OP CNSLTJ NEW/EST MOD 40: CPT

## 2020-12-04 PROCEDURE — 99024 POSTOP FOLLOW-UP VISIT: CPT

## 2020-12-04 PROCEDURE — 99072 ADDL SUPL MATRL&STAF TM PHE: CPT

## 2020-12-04 PROCEDURE — 93000 ELECTROCARDIOGRAM COMPLETE: CPT

## 2020-12-04 RX ORDER — LEVOTHYROXINE SODIUM 100 UG/1
100 TABLET ORAL DAILY
Refills: 0 | Status: ACTIVE | COMMUNITY

## 2020-12-04 RX ORDER — NITROFURANTOIN (MONOHYDRATE/MACROCRYSTALS) 25; 75 MG/1; MG/1
100 CAPSULE ORAL
Qty: 14 | Refills: 2 | Status: DISCONTINUED | COMMUNITY
Start: 2019-10-17 | End: 2020-12-04

## 2020-12-04 RX ORDER — BETHANECHOL CHLORIDE 50 MG/1
50 TABLET ORAL
Qty: 60 | Refills: 5 | Status: DISCONTINUED | COMMUNITY
Start: 2020-10-06 | End: 2020-12-04

## 2020-12-04 NOTE — DISCUSSION/SUMMARY
[FreeTextEntry1] : Doing well 3 weeks post op. Precautions reviewed. Followup in 3 weeks for internal exam.

## 2020-12-04 NOTE — REASON FOR VISIT
[Initial Evaluation] : an initial evaluation of [Abnormal ECG] : an abnormal ECG [Palpitations] : palpitations

## 2020-12-04 NOTE — HISTORY OF PRESENT ILLNESS
[FreeTextEntry1] : About 3 weeks s/p robotic ONRMA BS SCP sling. Doing well. Little post op pain, did not take pain meds. Getting up only 1 time at night, able to hold it longer. She feels she is emptying fully. She is taking senecot and fiber cereal. No leaking of urine. Path normal.

## 2020-12-04 NOTE — PHYSICAL EXAM
[Scar] : a scar was noted [Tenderness] : ~T no ~M abdominal tenderness observed [Distended] : not distended

## 2020-12-07 ENCOUNTER — APPOINTMENT (OUTPATIENT)
Dept: UROGYNECOLOGY | Facility: CLINIC | Age: 60
End: 2020-12-07

## 2020-12-08 NOTE — HISTORY OF PRESENT ILLNESS
[FreeTextEntry1] : Isela Couch is a 60-year-old woman who presents for cardiology consultation due to an abnormal electrocardiogram. She describes several recent hospitalizations for right ureteral stone, urosepsis, and hysterectomy but has no past history of heart disease. She describes several recent ECGs being interpreted as abnormal and was told by her primary care physician that she should seek cardiology consultation. She does not exercise but has a good overall baseline functional status and has not been experiencing angina, dyspnea, or syncope; she describes occasional palpitations -- no associated syncope; unable to identify clear exacerbating or alleviating factors.

## 2020-12-08 NOTE — DISCUSSION/SUMMARY
[FreeTextEntry1] : \par Abnormal ECG: Recent ECGs are mildly abnormal with poor precordial R wave progression and borderline abnormal Q waves in leads III and aVF (tiny R wave in lead III); no clear past history of heart disease; I recommend echocardiography and exercise ECG stress testing to further evaluate these abnormalities.\par \par Palpitations: Infrequent; echocardiogram planned to assess left ventricular systolic function.\par \par * Mrs. Couch plans to return for stress testing in January because she needs several additional weeks to heal from her recent hysterectomy prior to exercise.

## 2020-12-08 NOTE — PHYSICAL EXAM
[Well Groomed] : well groomed [General Appearance - In No Acute Distress] : no acute distress [Eyelids - No Xanthelasma] : the eyelids demonstrated no xanthelasmas [Heart Rate And Rhythm] : heart rate and rhythm were normal [Heart Sounds] : normal S1 and S2 [Murmurs] : no murmurs present [Respiration, Rhythm And Depth] : normal respiratory rhythm and effort [Auscultation Breath Sounds / Voice Sounds] : lungs were clear to auscultation bilaterally [Bowel Sounds] : normal bowel sounds [Abdomen Soft] : soft [Abnormal Walk] : normal gait [Nail Clubbing] : no clubbing of the fingernails [Cyanosis, Localized] : no localized cyanosis [] : no rash [Oriented To Time, Place, And Person] : oriented to person, place, and time [Impaired Insight] : insight and judgment were intact [FreeTextEntry1] : No JVD

## 2020-12-08 NOTE — ADDENDUM
[FreeTextEntry1] : Patient is unable to exercise on a treadmill due to recent gynecologic surgery - Will change ischemia evaluation to a pharmacologic nuclear stress test to avoid delay in diagnostic information.

## 2020-12-14 ENCOUNTER — APPOINTMENT (OUTPATIENT)
Dept: CARDIOLOGY | Facility: CLINIC | Age: 60
End: 2020-12-14
Payer: COMMERCIAL

## 2020-12-14 PROCEDURE — 93306 TTE W/DOPPLER COMPLETE: CPT

## 2020-12-14 PROCEDURE — 99072 ADDL SUPL MATRL&STAF TM PHE: CPT

## 2020-12-29 ENCOUNTER — NON-APPOINTMENT (OUTPATIENT)
Age: 60
End: 2020-12-29

## 2021-01-08 ENCOUNTER — APPOINTMENT (OUTPATIENT)
Dept: UROGYNECOLOGY | Facility: CLINIC | Age: 61
End: 2021-01-08
Payer: COMMERCIAL

## 2021-01-08 VITALS — DIASTOLIC BLOOD PRESSURE: 80 MMHG | SYSTOLIC BLOOD PRESSURE: 130 MMHG

## 2021-01-08 DIAGNOSIS — Z98.890 OTHER SPECIFIED POSTPROCEDURAL STATES: ICD-10-CM

## 2021-01-08 LAB
APPEARANCE: ABNORMAL
BACTERIA UR CULT: ABNORMAL
BACTERIA: ABNORMAL
BILIRUB UR QL STRIP: NEGATIVE
BILIRUBIN URINE: NEGATIVE
BLOOD URINE: ABNORMAL
CLARITY UR: CLEAR
COLLECTION METHOD: NORMAL
COLOR: YELLOW
GLUCOSE QUALITATIVE U: NEGATIVE
GLUCOSE UR-MCNC: NEGATIVE
HCG UR QL: 0.2 EU/DL
HGB UR QL STRIP.AUTO: NORMAL
HYALINE CASTS: 0 /LPF
KETONES UR-MCNC: NEGATIVE
KETONES URINE: NEGATIVE
LEUKOCYTE ESTERASE UR QL STRIP: NORMAL
LEUKOCYTE ESTERASE URINE: ABNORMAL
MICROSCOPIC-UA: NORMAL
NITRITE UR QL STRIP: NEGATIVE
NITRITE URINE: POSITIVE
PH UR STRIP: 5.5
PH URINE: 6
PROT UR STRIP-MCNC: NEGATIVE
PROTEIN URINE: NEGATIVE
RED BLOOD CELLS URINE: 1 /HPF
SP GR UR STRIP: 1.02
SPECIFIC GRAVITY URINE: 1.02
SQUAMOUS EPITHELIAL CELLS: 0 /HPF
URINE CYTOLOGY: NORMAL
UROBILINOGEN URINE: NORMAL
WHITE BLOOD CELLS URINE: 136 /HPF

## 2021-01-08 PROCEDURE — 81003 URINALYSIS AUTO W/O SCOPE: CPT | Mod: QW

## 2021-01-08 PROCEDURE — 99024 POSTOP FOLLOW-UP VISIT: CPT

## 2021-01-08 NOTE — DISCUSSION/SUMMARY
[FreeTextEntry1] : Doing well post op. Cleared to gradually return to regular activity. Followup in 4 months.

## 2021-01-08 NOTE — HISTORY OF PRESENT ILLNESS
[FreeTextEntry1] : About 7 weeks s/p robotic jigna bs scp and sling doing well. Feels she is voiding less frequently than preop. No sensation of incomplete emptying. No leaking of urine. Dealing with significant constipation. Taking prune juice, fiber, senna gummies. Had significant constipation since the summer.

## 2021-01-12 ENCOUNTER — APPOINTMENT (OUTPATIENT)
Dept: CARDIOLOGY | Facility: CLINIC | Age: 61
End: 2021-01-12

## 2021-01-19 ENCOUNTER — APPOINTMENT (OUTPATIENT)
Dept: CARDIOLOGY | Facility: CLINIC | Age: 61
End: 2021-01-19
Payer: COMMERCIAL

## 2021-01-19 PROCEDURE — 99072 ADDL SUPL MATRL&STAF TM PHE: CPT

## 2021-01-19 PROCEDURE — 93015 CV STRESS TEST SUPVJ I&R: CPT

## 2021-01-25 ENCOUNTER — APPOINTMENT (OUTPATIENT)
Dept: CARDIOLOGY | Facility: CLINIC | Age: 61
End: 2021-01-25

## 2021-05-20 ENCOUNTER — APPOINTMENT (OUTPATIENT)
Dept: UROGYNECOLOGY | Facility: CLINIC | Age: 61
End: 2021-05-20
Payer: COMMERCIAL

## 2021-05-20 VITALS — DIASTOLIC BLOOD PRESSURE: 34 MMHG | SYSTOLIC BLOOD PRESSURE: 130 MMHG

## 2021-05-20 DIAGNOSIS — R39.15 URGENCY OF URINATION: ICD-10-CM

## 2021-05-20 DIAGNOSIS — R35.0 FREQUENCY OF MICTURITION: ICD-10-CM

## 2021-05-20 LAB
BILIRUB UR QL STRIP: NEGATIVE
CLARITY UR: CLEAR
COLLECTION METHOD: NORMAL
GLUCOSE UR-MCNC: NEGATIVE
HCG UR QL: 1 EU/DL
HGB UR QL STRIP.AUTO: NORMAL
KETONES UR-MCNC: NEGATIVE
LEUKOCYTE ESTERASE UR QL STRIP: NORMAL
NITRITE UR QL STRIP: NEGATIVE
PH UR STRIP: 5.5
PROT UR STRIP-MCNC: NEGATIVE
SP GR UR STRIP: 1.02

## 2021-05-20 PROCEDURE — 99213 OFFICE O/P EST LOW 20 MIN: CPT | Mod: 25

## 2021-05-20 PROCEDURE — 81003 URINALYSIS AUTO W/O SCOPE: CPT | Mod: QW

## 2021-05-20 PROCEDURE — 51798 US URINE CAPACITY MEASURE: CPT

## 2021-05-20 NOTE — HISTORY OF PRESENT ILLNESS
[FreeTextEntry1] : Is about 6 months s/p robotic NORMA BS SCP and sling. She is still dealing with constipation. She feels the constipation is about the same as it was preop. She is urinating well. No leaking of urine. Feels she is emptying her bladder. Has right sided lower back pain that goes away when she lies down. Similar to when she had stones in the past.

## 2021-05-20 NOTE — DISCUSSION/SUMMARY
[FreeTextEntry1] : Doing well after robotic reconstruction. Has history of stones, complaining of flank pain and blood in urine. CT scan ordered. Followup in 6 months.

## 2021-05-25 ENCOUNTER — APPOINTMENT (OUTPATIENT)
Dept: CT IMAGING | Facility: CLINIC | Age: 61
End: 2021-05-25
Payer: COMMERCIAL

## 2021-05-25 ENCOUNTER — OUTPATIENT (OUTPATIENT)
Dept: OUTPATIENT SERVICES | Facility: HOSPITAL | Age: 61
LOS: 1 days | End: 2021-05-25
Payer: COMMERCIAL

## 2021-05-25 DIAGNOSIS — Z96.0 PRESENCE OF UROGENITAL IMPLANTS: Chronic | ICD-10-CM

## 2021-05-25 DIAGNOSIS — Z00.8 ENCOUNTER FOR OTHER GENERAL EXAMINATION: ICD-10-CM

## 2021-05-25 DIAGNOSIS — Z98.890 OTHER SPECIFIED POSTPROCEDURAL STATES: Chronic | ICD-10-CM

## 2021-05-25 DIAGNOSIS — N20.0 CALCULUS OF KIDNEY: ICD-10-CM

## 2021-05-25 PROCEDURE — 74176 CT ABD & PELVIS W/O CONTRAST: CPT | Mod: 26

## 2021-05-25 PROCEDURE — 74176 CT ABD & PELVIS W/O CONTRAST: CPT

## 2021-12-02 ENCOUNTER — APPOINTMENT (OUTPATIENT)
Dept: UROGYNECOLOGY | Facility: CLINIC | Age: 61
End: 2021-12-02

## 2021-12-15 ENCOUNTER — APPOINTMENT (OUTPATIENT)
Dept: UROGYNECOLOGY | Facility: CLINIC | Age: 61
End: 2021-12-15
Payer: COMMERCIAL

## 2021-12-15 ENCOUNTER — RESULT CHARGE (OUTPATIENT)
Age: 61
End: 2021-12-15

## 2021-12-15 DIAGNOSIS — N39.0 URINARY TRACT INFECTION, SITE NOT SPECIFIED: ICD-10-CM

## 2021-12-15 DIAGNOSIS — R35.0 FREQUENCY OF MICTURITION: ICD-10-CM

## 2021-12-15 DIAGNOSIS — R35.1 NOCTURIA: ICD-10-CM

## 2021-12-15 DIAGNOSIS — K59.00 CONSTIPATION, UNSPECIFIED: ICD-10-CM

## 2021-12-15 LAB
BILIRUB UR QL STRIP: NEGATIVE
CLARITY UR: CLEAR
COLLECTION METHOD: NORMAL
GLUCOSE UR-MCNC: NEGATIVE
HCG UR QL: 1 EU/DL
HGB UR QL STRIP.AUTO: NORMAL
KETONES UR-MCNC: NEGATIVE
LEUKOCYTE ESTERASE UR QL STRIP: NORMAL
NITRITE UR QL STRIP: NEGATIVE
PH UR STRIP: 6
PROT UR STRIP-MCNC: NEGATIVE
SP GR UR STRIP: >=1.03

## 2021-12-15 PROCEDURE — 99213 OFFICE O/P EST LOW 20 MIN: CPT | Mod: 25

## 2021-12-15 PROCEDURE — 51798 US URINE CAPACITY MEASURE: CPT

## 2021-12-15 NOTE — DISCUSSION/SUMMARY
[FreeTextEntry1] : Doing well s/p robotic repair. Having persistent bowel issues that have been refractory to our treatments. We discussed how chronic repetitive straining could potentially lead to failure. I referred her for GI motility Jessica Hartmann. I asked her to followup with us as needed.

## 2021-12-15 NOTE — HISTORY OF PRESENT ILLNESS
[FreeTextEntry1] : About 13 months s/p robotic NORMA BS SCP and minisling. She is still dealing with chronic constipation. Has modified her diet. She tried senacot, miralax, mag 07 and nothing has worked. She did have a recent colonoscopy that was normal. Feels she is urinating ok. She is back to getting up 1 time at night. She is not leaking urine.

## 2022-03-01 ENCOUNTER — APPOINTMENT (OUTPATIENT)
Dept: GASTROENTEROLOGY | Facility: CLINIC | Age: 62
End: 2022-03-01
Payer: COMMERCIAL

## 2022-03-01 VITALS
TEMPERATURE: 98.2 F | OXYGEN SATURATION: 98 % | DIASTOLIC BLOOD PRESSURE: 82 MMHG | SYSTOLIC BLOOD PRESSURE: 124 MMHG | BODY MASS INDEX: 32.27 KG/M2 | RESPIRATION RATE: 14 BRPM | HEIGHT: 64 IN | HEART RATE: 92 BPM | WEIGHT: 189 LBS

## 2022-03-01 DIAGNOSIS — K59.01 SLOW TRANSIT CONSTIPATION: ICD-10-CM

## 2022-03-01 PROCEDURE — 99214 OFFICE O/P EST MOD 30 MIN: CPT

## 2022-03-01 PROCEDURE — 99204 OFFICE O/P NEW MOD 45 MIN: CPT

## 2022-03-01 PROCEDURE — 82272 OCCULT BLD FECES 1-3 TESTS: CPT

## 2022-03-01 RX ORDER — NITROFURANTOIN (MONOHYDRATE/MACROCRYSTALS) 25; 75 MG/1; MG/1
100 CAPSULE ORAL TWICE DAILY
Qty: 10 | Refills: 0 | Status: DISCONTINUED | COMMUNITY
Start: 2021-05-28 | End: 2022-03-01

## 2022-03-01 RX ORDER — ADHESIVE TAPE 3"X 2.3 YD
50 MCG TAPE, NON-MEDICATED TOPICAL DAILY
Refills: 0 | Status: DISCONTINUED | COMMUNITY
End: 2022-03-01

## 2022-03-01 NOTE — REASON FOR VISIT
[Initial Evaluation] : an initial evaluation [FreeTextEntry1] : constipation, r/o pelvic dyssynergia

## 2022-03-01 NOTE — HISTORY OF PRESENT ILLNESS
[de-identified] : The patient is here with chronic constipation.  She has a history of pelvic floor prolapse and required a sling in 2020, kidney stones, hysterectomy, hypothyroidism.\par     Patient has had chronic constipation for over 20 years.  However has been worse in the past 2.5 years.  She states it was intermittent and now symptoms are chronic every day.  She has a hard bowel movement every 3 to 4 days.  She must sometimes lean back to have a bowel movement.  She has mild improvement when she takes prunes.  No improvement with MiraLAX Senokot or mineral oil.  She has vomiting with magnesium.  She has no rectal bleeding.  No family history of of colon cancer.  She does have family history of polyps in her brother and father.  Her last colonoscopy was September 2021.  No polyps.  I do not have the reports.

## 2022-03-01 NOTE — PHYSICAL EXAM
[General Appearance - Alert] : alert [General Appearance - In No Acute Distress] : in no acute distress [General Appearance - Well Nourished] : well nourished [General Appearance - Well Developed] : well developed [Sclera] : the sclera and conjunctiva were normal [] : no respiratory distress [Respiration, Rhythm And Depth] : normal respiratory rhythm and effort [Exaggerated Use Of Accessory Muscles For Inspiration] : no accessory muscle use [Auscultation Breath Sounds / Voice Sounds] : lungs were clear to auscultation bilaterally [Apical Impulse] : the apical impulse was normal [Heart Rate And Rhythm] : heart rate was normal and rhythm regular [Heart Sounds] : normal S1 and S2 [Bowel Sounds] : normal bowel sounds [Abdomen Soft] : soft [Abdomen Tenderness] : non-tender [Normal Sphincter Tone] : normal sphincter tone [No Rectal Mass] : no rectal mass [Femoral Lymph Nodes Enlarged Bilaterally] : femoral [Abnormal Walk] : normal gait [Skin Color & Pigmentation] : normal skin color and pigmentation [Oriented To Time, Place, And Person] : oriented to person, place, and time [Impaired Insight] : insight and judgment were intact [Affect] : the affect was normal [Internal Hemorrhoid] : no internal hemorrhoids [External Hemorrhoid] : no external hemorrhoids [Occult Blood Positive] : stool was negative for occult blood [FreeTextEntry1] : Anus appeared to relax with bearing down

## 2022-03-01 NOTE — ASSESSMENT
[FreeTextEntry1] : A/P\par Patient with chronic constipation.  Has become worse in the past 2.5 years.\par -We will give Linzess to 90 mcg daily.  Patient was told if no improvement within a week she may add MiraLAX 17 g in the evening.\par -If no improvement I will obtain her TSH results from her primary MD. patient with hypothyroidism\par -Patient describes that she at times needs to lean back to have a bowel movement.  She may have pelvic dyssynergia.  Patient will call me back in 2 weeks to give me an update if the Linzess is helping her symptoms.  If no relief of her constipation then we should consider MRI defecography to rule out pelvic dyssynergia\par -Family history of colon polyps.  Colonoscopy in 2026\par -Follow-up in 3 months\par Visit was 45 minutes.  I reviewed the urogynecology notes\par -

## 2022-04-13 ENCOUNTER — APPOINTMENT (OUTPATIENT)
Dept: MRI IMAGING | Facility: CLINIC | Age: 62
End: 2022-04-13
Payer: COMMERCIAL

## 2022-04-13 ENCOUNTER — OUTPATIENT (OUTPATIENT)
Dept: OUTPATIENT SERVICES | Facility: HOSPITAL | Age: 62
LOS: 1 days | End: 2022-04-13
Payer: COMMERCIAL

## 2022-04-13 DIAGNOSIS — Z98.890 OTHER SPECIFIED POSTPROCEDURAL STATES: Chronic | ICD-10-CM

## 2022-04-13 DIAGNOSIS — Z96.0 PRESENCE OF UROGENITAL IMPLANTS: Chronic | ICD-10-CM

## 2022-04-13 DIAGNOSIS — K59.00 CONSTIPATION, UNSPECIFIED: ICD-10-CM

## 2022-04-13 PROCEDURE — 72195 MRI PELVIS W/O DYE: CPT | Mod: 26

## 2022-04-13 PROCEDURE — 72195 MRI PELVIS W/O DYE: CPT

## 2022-04-27 RX ORDER — LINACLOTIDE 290 UG/1
290 CAPSULE, GELATIN COATED ORAL
Qty: 90 | Refills: 1 | Status: ACTIVE | COMMUNITY
Start: 2022-03-01 | End: 1900-01-01

## 2023-03-03 NOTE — ED ADULT NURSE NOTE - ISOLATION TYPE:
Called maggie amos,  No answer left voicemail to call the office back.   
I now have privileges and can sign orders  
Paperwork signed.  Did it get sent through yet/   
Placed call to pt. Per pt, she sees oncologist once a year. Port was placed 13 years ago when she was diagnosed with breast cancer. Pt is a hard stick, and states she has blood work done every 6 weeks, and port is used for this.     Pt is asking where she can go now to get the port flushed, as she wants to keep it in place, but needs proper port care.     Due to merging [facility] and [facility], pt was notified they are no longer able to do it at [facility].     PLEASE ADVISE   
Please see if pt has a doctor that is managing her port?       
Pt reports has appointment on Thursday for labs to be drawn, and then port to be flushed. Reports not wanting port to be removed, wants to keep port in place. Pt concerned to ensure proper port care.    Was called today telling her that they're unable to draw or take PCP's orders now d/t [facility] taken over by [facility].     Pt looking for call back as to where she can go for port flush and labs drawn that is able to accept orders from PCP.   
Pt states that she has a doctor at [place] that want's dr. merrill to take it over so then pt doesn't have to drive as far anymore.      
See other encounter.   
Voicemail left to call office back.   
None

## 2023-03-20 NOTE — ASU PREOP CHECKLIST - RESPIRATORY RATE (BREATHS/MIN)
Prior Auth attempted , response below   Additional Information Required  Member has an active PA on file which is expiring on 02/12/2024 and has 11 no. of fills remaining.   16

## 2023-11-24 NOTE — H&P ADULT - NSICDXFAMILYHX_GEN_ALL_CORE_FT
Spoke with patient and assisted her in scheduling a hospital follow up appointment.   FAMILY HISTORY:  FH: diabetes mellitus, maternal uncle, brother    Father  Still living? Unknown  FH: coronary artery disease, Age at diagnosis: Age Unknown    Mother  Still living? Unknown  FH: coronary artery disease, Age at diagnosis: Age Unknown

## 2024-07-30 NOTE — BRIEF OPERATIVE NOTE - NSICDXBRIEFPOSTOP_GEN_ALL_CORE_FT
Is she stops insulin and A1C goes to normal will be no DM but it takes like 24 months.   POST-OP DIAGNOSIS:  Ureteral stone 30-Sep-2020 11:14:32  Flex De La Cruz